# Patient Record
Sex: FEMALE | Race: WHITE | NOT HISPANIC OR LATINO | Employment: OTHER | URBAN - METROPOLITAN AREA
[De-identification: names, ages, dates, MRNs, and addresses within clinical notes are randomized per-mention and may not be internally consistent; named-entity substitution may affect disease eponyms.]

---

## 2018-01-13 LAB
INFLUENZA A ANTIGEN, POC: POSITIVE
INFLUENZA B ANTIGEN, POC: NEGATIVE
RAPID GROUP A STREP (OHS): NEGATIVE

## 2021-03-11 ENCOUNTER — HISTORICAL (OUTPATIENT)
Dept: RADIOLOGY | Facility: HOSPITAL | Age: 76
End: 2021-03-11

## 2021-03-15 ENCOUNTER — HISTORICAL (OUTPATIENT)
Dept: ADMINISTRATIVE | Facility: HOSPITAL | Age: 76
End: 2021-03-15

## 2021-03-15 LAB
BUN SERPL-MCNC: 11 MG/DL (ref 8–27)
CALCIUM SERPL-MCNC: 9.4 MG/DL (ref 8.7–10.3)
CHLORIDE SERPL-SCNC: 103 MMOL/L (ref 96–106)
CO2 SERPL-SCNC: 23 MMOL/L (ref 20–29)
CREAT SERPL-MCNC: 0.7 MG/DL (ref 0.57–1)
POTASSIUM SERPL-SCNC: 4.5 MMOL/L (ref 3.5–5.2)
SODIUM SERPL-SCNC: 142 MMOL/L (ref 134–144)

## 2021-06-01 ENCOUNTER — HISTORICAL (OUTPATIENT)
Dept: ADMINISTRATIVE | Facility: HOSPITAL | Age: 76
End: 2021-06-01

## 2021-06-01 LAB
ALBUMIN SERPL-MCNC: 4.3 G/DL (ref 3.7–4.7)
ALBUMIN/GLOB SERPL: 1.4 {RATIO} (ref 1.2–2.2)
ALP SERPL-CCNC: 56 IU/L (ref 48–121)
ALT SERPL-CCNC: 17 IU/L (ref 0–32)
AST SERPL-CCNC: 17 IU/L (ref 0–40)
BASOPHILS # BLD AUTO: 0.1 X10E3/UL (ref 0–0.2)
BASOPHILS NFR BLD AUTO: 1 %
BILIRUB SERPL-MCNC: 0.3 MG/DL (ref 0–1.2)
BUN SERPL-MCNC: 14 MG/DL (ref 8–27)
CALCIUM SERPL-MCNC: 9.5 MG/DL (ref 8.7–10.3)
CHLORIDE SERPL-SCNC: 102 MMOL/L (ref 96–106)
CHOLEST SERPL-MCNC: 138 MG/DL (ref 100–199)
CHOLEST/HDLC SERPL: 2.7 RATIO (ref 0–4.4)
CO2 SERPL-SCNC: 25 MMOL/L (ref 20–29)
CREAT SERPL-MCNC: 0.76 MG/DL (ref 0.57–1)
CREAT/UREA NIT SERPL: 18 (ref 12–28)
DEPRECATED CALCIDIOL+CALCIFEROL SERPL-MC: 33.8 NG/ML (ref 30–100)
EOSINOPHIL # BLD AUTO: 0.3 X10E3/UL (ref 0–0.4)
EOSINOPHIL NFR BLD AUTO: 3 %
ERYTHROCYTE [DISTWIDTH] IN BLOOD BY AUTOMATED COUNT: 15.6 % (ref 11.7–15.4)
GLOBULIN SER-MCNC: 3 G/DL (ref 1.5–4.5)
GLUCOSE SERPL-MCNC: 150 MG/DL (ref 65–99)
HCT VFR BLD AUTO: 39.8 % (ref 34–46.6)
HDLC SERPL-MCNC: 51 MG/DL
HGB BLD-MCNC: 12.2 G/DL (ref 11.1–15.9)
LDLC SERPL CALC-MCNC: 69 MG/DL (ref 0–99)
LYMPHOCYTES # BLD AUTO: 2.3 X10E3/UL (ref 0.7–3.1)
LYMPHOCYTES NFR BLD AUTO: 23 %
MCH RBC QN AUTO: 26.5 PG (ref 26.6–33)
MCHC RBC AUTO-ENTMCNC: 30.7 G/DL (ref 31.5–35.7)
MCV RBC AUTO: 86 FL (ref 79–97)
MICROALBUMIN/CREAT RATIO PNL UR: 7 MG/G CREAT (ref 0–29)
MONOCYTES # BLD AUTO: 0.7 X10E3/UL (ref 0.1–0.9)
MONOCYTES NFR BLD AUTO: 7 %
NEUTROPHILS # BLD AUTO: 6.6 X10E3/UL (ref 1.4–7)
NEUTROPHILS NFR BLD AUTO: 66 %
PLATELET # BLD AUTO: 268 X10E3/UL (ref 150–450)
POTASSIUM SERPL-SCNC: 4.5 MMOL/L (ref 3.5–5.2)
PROT SERPL-MCNC: 7.3 G/DL (ref 6–8.5)
RBC # BLD AUTO: 4.61 X10(6)/MCL (ref 3.77–5.28)
SODIUM SERPL-SCNC: 143 MMOL/L (ref 134–144)
TRIGL SERPL-MCNC: 98 MG/DL (ref 0–149)
TSH SERPL-ACNC: 1.69 MIU/ML (ref 0.45–4.5)
VLDLC SERPL CALC-MCNC: 18 MG/DL (ref 5–40)
WBC # SPEC AUTO: 10 X10E3/UL (ref 3.4–10.8)

## 2021-06-03 LAB — EST CREAT CLEARANCE SER (OHS): 110.06 ML/MIN

## 2021-07-07 ENCOUNTER — HISTORICAL (OUTPATIENT)
Dept: RADIOLOGY | Facility: HOSPITAL | Age: 76
End: 2021-07-07

## 2022-04-09 ENCOUNTER — HISTORICAL (OUTPATIENT)
Dept: ADMINISTRATIVE | Facility: HOSPITAL | Age: 77
End: 2022-04-09

## 2022-04-29 VITALS
OXYGEN SATURATION: 98 % | BODY MASS INDEX: 40.32 KG/M2 | WEIGHT: 242 LBS | HEIGHT: 65 IN | SYSTOLIC BLOOD PRESSURE: 132 MMHG | DIASTOLIC BLOOD PRESSURE: 75 MMHG

## 2022-04-30 NOTE — PROGRESS NOTES
Patient:   Arelis Loya            MRN: [id number]            FIN: [id number]               Age:   75 years     Sex:  Female     :  1945   Associated Diagnoses:   None   Author:   Magdalena Siddiqi      BP log reviewed:  - above goal  - increase    Valsartan to 320 mg daily   - Continue    Amlodipine 10 mg daily    Bisoprolol 5 mg daily    HCTZ 25 mg daily  - Repeat BP log x1 week   - BMP x1 week   * Time spent: 8 minutes

## 2022-07-05 PROBLEM — G47.00 INSOMNIA: Chronic | Status: ACTIVE | Noted: 2022-07-05

## 2022-07-05 PROBLEM — E66.9 TYPE 2 DIABETES MELLITUS WITH OBESITY: Status: ACTIVE | Noted: 2022-07-05

## 2022-07-05 PROBLEM — E66.01 MORBID OBESITY: Status: ACTIVE | Noted: 2022-07-05

## 2022-07-05 PROBLEM — M43.16 SPONDYLOLISTHESIS OF LUMBAR REGION: Chronic | Status: ACTIVE | Noted: 2022-07-05

## 2022-07-05 PROBLEM — I10 HYPERTENSION: Chronic | Status: ACTIVE | Noted: 2022-07-05

## 2022-07-05 PROBLEM — M43.16 SPONDYLOLISTHESIS OF LUMBAR REGION: Status: ACTIVE | Noted: 2022-07-05

## 2022-07-05 PROBLEM — E11.69 TYPE 2 DIABETES MELLITUS WITH OBESITY: Status: ACTIVE | Noted: 2022-07-05

## 2022-07-05 PROBLEM — M54.16 LUMBAR RADICULOPATHY: Chronic | Status: ACTIVE | Noted: 2022-07-05

## 2022-07-05 PROBLEM — K21.9 GASTROESOPHAGEAL REFLUX DISEASE WITHOUT ESOPHAGITIS: Status: ACTIVE | Noted: 2022-07-05

## 2022-07-05 PROBLEM — M19.90 ARTHRITIS: Status: ACTIVE | Noted: 2022-07-05

## 2022-07-05 PROBLEM — E66.9 TYPE 2 DIABETES MELLITUS WITH OBESITY: Chronic | Status: ACTIVE | Noted: 2022-07-05

## 2022-07-05 PROBLEM — E11.69 TYPE 2 DIABETES MELLITUS WITH OBESITY: Chronic | Status: ACTIVE | Noted: 2022-07-05

## 2022-07-05 PROBLEM — E66.01 MORBID OBESITY: Chronic | Status: ACTIVE | Noted: 2022-07-05

## 2022-07-05 PROBLEM — M54.16 LUMBAR RADICULOPATHY: Status: ACTIVE | Noted: 2022-07-05

## 2022-07-05 PROBLEM — K21.9 GASTROESOPHAGEAL REFLUX DISEASE WITHOUT ESOPHAGITIS: Chronic | Status: ACTIVE | Noted: 2022-07-05

## 2022-07-05 PROBLEM — E78.00 PURE HYPERCHOLESTEROLEMIA: Status: ACTIVE | Noted: 2022-07-05

## 2022-07-05 PROBLEM — I10 HYPERTENSION: Status: ACTIVE | Noted: 2022-07-05

## 2022-07-05 PROBLEM — M19.90 ARTHRITIS: Chronic | Status: ACTIVE | Noted: 2022-07-05

## 2022-07-05 PROBLEM — J30.2 SEASONAL ALLERGIES: Status: ACTIVE | Noted: 2022-07-05

## 2022-07-05 PROBLEM — J30.2 SEASONAL ALLERGIES: Chronic | Status: ACTIVE | Noted: 2022-07-05

## 2022-07-05 PROBLEM — G47.00 INSOMNIA: Status: ACTIVE | Noted: 2022-07-05

## 2022-07-05 PROBLEM — E78.00 PURE HYPERCHOLESTEROLEMIA: Chronic | Status: ACTIVE | Noted: 2022-07-05

## 2022-07-13 PROBLEM — Z00.00 WELLNESS EXAMINATION: Status: ACTIVE | Noted: 2022-07-13

## 2022-09-16 ENCOUNTER — HISTORICAL (OUTPATIENT)
Dept: ADMINISTRATIVE | Facility: HOSPITAL | Age: 77
End: 2022-09-16

## 2022-10-17 PROBLEM — Z00.00 WELLNESS EXAMINATION: Status: RESOLVED | Noted: 2022-07-13 | Resolved: 2022-10-17

## 2023-06-13 NOTE — PROGRESS NOTES
Subjective:      Patient ID: Arelis Loya is a 77 y.o. female.    Chief Complaint: Back Pain (Patient referred for low back pain which radiates down legs. Rates current pain at 8/10. Medication gives some relief. )    Referred by: Magdalena Monroy, *     HPI: Patient presents as a new consult for chronic low back pain with sciatica and lumbar radiculopathy.  Pain started spontaneously in 2010.  Pain located to bilateral low back, radiates to bilateral buttocks left greater than right, bilateral groin, with radiation to left anterior thigh radiating down to in her side of left knee.  Additionally she has pain to the right anterior top thigh the radiates just before the knee.  No radiation of pain to the feet.  Her legs feel weak.  She has never had a nerve conduction study or EMG.    Reports pain feels like a burning and achy sensation it is worse with walking prolonged standing and household chores is severely limited.  These activities elevate her pain 8-9/10.  Pain reduces with sitting or lying down to a 6/10.  Denies pain waking her up at night.    She has treated pain with physical therapy completed 3 weeks ago for 2-1/2 months.  This provided her no pain relief.  She continues home exercises.  In the past she completed 2 lumbar epidural steroids prior to her lumbar fusion they gave her no relief.    Medications include gabapentin 300 mg a day, Aleve 220 mg 3 times a day and tramadol unknown dose twice a day.      Pertinent past surgical history fusion L4 -5 (2021), No history of pacemaker, SCS or defrillators.     Interventional Pain History  Prior lumbar ESIs X 2  years  prior to lumbar fusion (ineffective)     ROS: Low back , buttock and leg pain    Labs 2023:  A1c 6.7  Creatinine 0.76   AST 17   ALT 15   GFR 76      MRI lumbar spine September 2022:   FINDINGS:  Normal lumbar curvature appears maintained.  Anterior and posterior fixation hardware is seen at L4-5 with orthopedic material at the L4-5  disc space.  The conus terminates at level of L1.  Vertebral body height and alignment appeared preserved.  Endplate signal changes adjacent to L2-3 and L5-S1 disc are compatible with Modic type changes.  Left renal cyst noted.      L2-3:  Disc bulging seen with large dorsal disc protrusion/extrusion extending 0.8 cm posterior and 1.3 cm cranial to its normal position producing moderate to severe spinal canal stenosis with encroachment and possible impingement on traversing nerve roots.  Moderately severe neural foraminal narrowing is seen bilaterally.    L3-4: Disc bulging seen with posterior facet hypertrophy and spondylitic changes effacing the ventral thecal sac and lateral recesses.  Mild neural foraminal narrowing appears to be present.      L4-5:  Posterior facet hypertrophy seen.      L5-S1: Disc bulging seen with spondylitic change and posterior facet hypertrophy.  A very small broad-based dorsal disc protrusion is seen.  There is effacement of the lateral recesses with encroachment traversing nerve roots.  Posterior facet hypertrophy noted.      IMPRESSION:  1.  Large dorsal disc protrusion/extrusion at L2-3 producing significant spinal canal stenosis and encroaching and likely impinging on transversing nerve roots.    2. postsurgical changes of L4-5.    3. Degenerative changes in the lumbar spine as above.  Neural foraminal narrowing seen at L2-3 and L3-4.    .            Objective:          Physical Exam  General: Well developed; overweight; A&O x 3; No anxiety/depression; NAD  Mental Status: Oriented to person, palce and time. Displays appropriate mood & affect.  Head: Norm cephalic and atraumatic  Neck: Midline trachea  Eyes: normal conjunctiva, normal lids, normal pupils  ENT and mouth: normal external ear, nose, and no lesions noted on the lips.  Respiratory: Symmetrical, Unlabored. No dyspnea  CV: normal  S1/S2, normal rhythm and rate. No peripheral edema.   Abdomen:  Non-distended    Extremities:  Gen: No cyanosis or tenderness to palpation bilateral upper and lower extremities  Skin: Warm, pink, dry, no rashes, no lesions on the lumbar spine  Strength: 5/5 motor strength bilateral upper and lower  ROM: Full ROM in bilateral knees and ankles without pain or instability.    Neuro:  Gait: no altalgic lean, normal toe and heel raise  DTR's: 2+ in bilateral patellar, and ankle  Sensory: Intact to light touch bilateral  upper and lower extremities    Spine: Normal lordosis. No scoliosis  L-spine ROM: pain with extension, and bilateral rotation, no pain and full ROm with flexion  Straight Leg Raise: + right , + left   SI Joint: No tenderness to palpation bilaterally.             Assessment:       Patient presents with bilateral low back pain radiating to the buttocks and anterior thighs past the knee on the left and stops just before the knee on the right.  No radiation to her feet.  Review of MRI lumbar spine notes L2-3 disc bulging seen with large dorsal disc protrusion/extrusion extending 0.8 cm posterior and 1.3 cm cranial to its normal position producing moderate to severe spinal canal stenosis with encroachment and possible impingement on traversing nerve roots.  Moderately severe neural foramen narrowing is seen bilaterally.L3-4: Disc bulging seen with posterior facet hypertrophy and spondylitic changes effacing the ventral thecal sac and lateral recesses.        Patient is a diabetic with controlled hemoglobin A1c.  She would like something to give her better pain relief while she is waiting to receive her epidural steroid.  Medrol Dosepak ordered for 21 days.  Hold ASA 81 mg and Aleeve one week prior to procdure.  (no clearance needed)  Request sent for TFESI bilateral L3   Follow up post op  Eventually would recommend weight loss to help with pain as well        Encounter Diagnoses   Name Primary?    Chronic midline low back pain with sciatica, sciatica laterality  unspecified Yes    Lumbar radiculopathy     Chronic low back pain with sciatica, sciatica laterality unspecified, unspecified back pain laterality          Plan:       Arelis was seen today for back pain.    Diagnoses and all orders for this visit:    Chronic midline low back pain with sciatica, sciatica laterality unspecified    Lumbar radiculopathy  -     Ambulatory referral/consult to Pain Clinic    Chronic low back pain with sciatica, sciatica laterality unspecified, unspecified back pain laterality  -     Ambulatory referral/consult to Pain Clinic                     Past Medical History:   Diagnosis Date    Arthritis 7/5/2022    Gastroesophageal reflux disease without esophagitis 7/5/2022    Hypertension 7/5/2022    Insomnia 7/5/2022    Lumbar radiculopathy 7/5/2022    Pure hypercholesterolemia 7/5/2022    Seasonal allergies 7/5/2022    Spondylolisthesis of lumbar region 7/5/2022    Type 2 diabetes mellitus with obesity 7/5/2022       Past Surgical History:   Procedure Laterality Date    back fusion Right     HYSTERECTOMY         History reviewed. No pertinent family history.    Social History     Socioeconomic History    Marital status:    Tobacco Use    Smoking status: Never    Smokeless tobacco: Never   Substance and Sexual Activity    Alcohol use: Not Currently       Current Outpatient Medications   Medication Sig Dispense Refill    amLODIPine (NORVASC) 10 MG tablet TAKE ONE TABLET BY MOUTH EVERY DAY 30 tablet 11    aspirin (ECOTRIN) 81 MG EC tablet TAKE ONE TABLET BY MOUTH DAILY 30 tablet 6    atorvastatin (LIPITOR) 40 MG tablet Take 40 mg by mouth once daily.      carvediloL (COREG) 25 MG tablet Take 1 tablet (25 mg total) by mouth 2 (two) times daily. 180 tablet 3    diphenoxylate-atropine 2.5-0.025 mg (LOMOTIL) 2.5-0.025 mg per tablet Take 1 tablet by mouth 4 (four) times daily as needed. 30 tablet 1    fluticasone propionate (FLONASE) 50 mcg/actuation nasal spray USE TWO SPRAYS IN EACH  NOSTRIL DAILY 16 g 3    gabapentin (NEURONTIN) 300 MG capsule Take 300 mg by mouth.      glimepiride (AMARYL) 2 MG tablet TAKE ONE TABLET BY MOUTH EVERY MORNING 30 tablet 11    hydroCHLOROthiazide (HYDRODIURIL) 25 MG tablet TAKE ONE TABLET BY MOUTH EVERY DAY 30 tablet 11    levocetirizine (XYZAL) 5 MG tablet See Instructions, TAKE ONE TABLET BY MOUTH EVERY EVENING, # 90 tab(s), 3 Refill(s), Pharmacy: [facility], 166, cm, Height/Length Dosing, 06/08/21 10:36:00 CDT, 109.76, kg, Weight Dosing, 12/20/21 14:14:00 CST 90 tablet 6    metFORMIN (GLUCOPHAGE) 500 MG tablet Take 500 mg by mouth.      pantoprazole (PROTONIX) 40 MG tablet TAKE ONE TABLET BY MOUTH EVERY DAY 30 tablet 6    SURE COMFORT LANCETS 28 gauge Misc CHECK BLOOD SUGAR ONCE DAILY      traZODone (DESYREL) 50 MG tablet Take 1 tablet (50 mg total) by mouth every evening. 30 tablet 11    TRUE METRIX GLUCOSE TEST STRIP Strp TEST BLOOD SUGAR ONCE DAILY      valsartan (DIOVAN) 320 MG tablet Take 1 tablet (320 mg total) by mouth once daily. 90 tablet 3    zinc 50 mg Tab Take 1 tablet by mouth every morning.      benzonatate (TESSALON) 200 MG capsule Take 200 mg by mouth 3 (three) times daily as needed.      vitamin D (VITAMIN D3) 1000 units Tab Take 1,000 Units by mouth once daily.       No current facility-administered medications for this visit.       Review of patient's allergies indicates:   Allergen Reactions    Celecoxib Shortness Of Breath    Meloxicam Itching and Shortness Of Breath    Moxifloxacin Shortness Of Breath    Rofecoxib Shortness Of Breath    Mepergan fortis     Mepergan Itching    Oseltamivir Nausea Only    Oxycodone-acetaminophen Itching

## 2023-06-14 ENCOUNTER — OFFICE VISIT (OUTPATIENT)
Dept: PAIN MEDICINE | Facility: CLINIC | Age: 78
End: 2023-06-14
Payer: MEDICARE

## 2023-06-14 VITALS
HEIGHT: 65 IN | BODY MASS INDEX: 39.32 KG/M2 | HEART RATE: 74 BPM | TEMPERATURE: 99 F | SYSTOLIC BLOOD PRESSURE: 101 MMHG | WEIGHT: 236 LBS | DIASTOLIC BLOOD PRESSURE: 52 MMHG

## 2023-06-14 DIAGNOSIS — M54.40 CHRONIC LOW BACK PAIN WITH SCIATICA, SCIATICA LATERALITY UNSPECIFIED, UNSPECIFIED BACK PAIN LATERALITY: ICD-10-CM

## 2023-06-14 DIAGNOSIS — M54.40 CHRONIC MIDLINE LOW BACK PAIN WITH SCIATICA, SCIATICA LATERALITY UNSPECIFIED: Primary | ICD-10-CM

## 2023-06-14 DIAGNOSIS — M51.36 BULGE OF LUMBAR DISC WITHOUT MYELOPATHY: ICD-10-CM

## 2023-06-14 DIAGNOSIS — G89.29 CHRONIC LOW BACK PAIN WITH SCIATICA, SCIATICA LATERALITY UNSPECIFIED, UNSPECIFIED BACK PAIN LATERALITY: ICD-10-CM

## 2023-06-14 DIAGNOSIS — G89.29 CHRONIC MIDLINE LOW BACK PAIN WITH SCIATICA, SCIATICA LATERALITY UNSPECIFIED: Primary | ICD-10-CM

## 2023-06-14 DIAGNOSIS — M54.16 LUMBAR RADICULOPATHY: ICD-10-CM

## 2023-06-14 PROCEDURE — 99204 PR OFFICE/OUTPT VISIT, NEW, LEVL IV, 45-59 MIN: ICD-10-PCS | Mod: ,,, | Performed by: NURSE PRACTITIONER

## 2023-06-14 PROCEDURE — 99204 OFFICE O/P NEW MOD 45 MIN: CPT | Mod: ,,, | Performed by: NURSE PRACTITIONER

## 2023-06-14 RX ORDER — METHYLPREDNISOLONE 4 MG/1
TABLET ORAL
Qty: 21 EACH | Refills: 0 | Status: SHIPPED | OUTPATIENT
Start: 2023-06-14 | End: 2023-06-27 | Stop reason: ALTCHOICE

## 2023-06-27 ENCOUNTER — OFFICE VISIT (OUTPATIENT)
Dept: PAIN MEDICINE | Facility: CLINIC | Age: 78
End: 2023-06-27
Payer: MEDICARE

## 2023-06-27 VITALS
WEIGHT: 236 LBS | BODY MASS INDEX: 39.32 KG/M2 | DIASTOLIC BLOOD PRESSURE: 73 MMHG | RESPIRATION RATE: 20 BRPM | HEIGHT: 65 IN | SYSTOLIC BLOOD PRESSURE: 133 MMHG | HEART RATE: 83 BPM | TEMPERATURE: 98 F

## 2023-06-27 DIAGNOSIS — M54.16 LUMBAR RADICULOPATHY: ICD-10-CM

## 2023-06-27 DIAGNOSIS — M51.36 DDD (DEGENERATIVE DISC DISEASE), LUMBAR: Primary | ICD-10-CM

## 2023-06-27 PROCEDURE — 99213 OFFICE O/P EST LOW 20 MIN: CPT | Mod: ,,, | Performed by: NURSE PRACTITIONER

## 2023-06-27 PROCEDURE — 99213 PR OFFICE/OUTPT VISIT, EST, LEVL III, 20-29 MIN: ICD-10-PCS | Mod: ,,, | Performed by: NURSE PRACTITIONER

## 2023-06-27 NOTE — H&P (VIEW-ONLY)
ADMISSION HISTORY & PHYSICAL    SUBJECTIVE:    CHIEF COMPLAINT: Back Pain (Chronic lower back pain with sciatica laterally specified. Pre Op for Transforaminal epidural steroid injection.)       History of Present Illness: 77 y.o. female presents today for preoperative evaluation for transforaminal bilateral L3 epidural steroid injection scheduled for 07/20/2023. I reviewed the indications for procedure. The risks and benefits of the proposed and alternative treatments were discussed with the patient. Questions pertinent to the procedure were solicited and answered. No assurances were given. Informed consent was obtained. The patient expressed good understanding and wished to proceed with scheduling the procedure.     Review of Systems:   Constitutional: No fever, weakness, or fatigue.   Ear/Nose/Mouth/Throat: No nasal congestion or sore throat.   Respiratory: No shortness of breath or cough.   Cardiovascular: No chest pain, palpitations, or peripheral edema.   Gastrointestinal: No nausea, vomiting, or abdominal pain.   Genitourinary: No dysuria.  Musculoskeletal: weakness to bilateral legs    Past Surgical History:   Procedure Laterality Date    back fusion Right     HYSTERECTOMY          Past Medical History:   Diagnosis Date    Arthritis 7/5/2022    Gastroesophageal reflux disease without esophagitis 7/5/2022    Hypertension 7/5/2022    Insomnia 7/5/2022    Lumbar radiculopathy 7/5/2022    Pure hypercholesterolemia 7/5/2022    Seasonal allergies 7/5/2022    Spondylolisthesis of lumbar region 7/5/2022    Type 2 diabetes mellitus with obesity 7/5/2022        OBJECTIVE:    Vitals:    06/27/23 1357   BP: 133/73   Pulse: 83   Resp:    Temp: 98.4 °F (36.9 °C)        Physical Exam:   General: Well-developed, well-nourished.  Neuro: Alert and oriented x 3.  Psych: Normal mood and affect.  HEENT: Normocephalic. PERRLA EOM intact. Nose and throat clear.  Lungs: Clear to auscultation and percussion.  Heart: Regular rate and  rhythm   Abdomen: Soft non-tender. Bowel sounds positive. No rebound tenderness.  Skin: No rashes or open wounds  Musc: bilateral SLR 4/5 bilateral lower ext and 5/5 bilateral upper ext  Gait: antalgic with walker    ASSESSMENT:  There are no diagnoses linked to this encounter.     PLAN:  Plan for to proceed with bilateral L3 transforaminal epidural steroid injection. The patient has been given preoperative instructions and prescriptions for post-operative medication. Post-operative appointment is scheduled for 2 weeks.     Clearance to  Cardiologist Dr Vernon Valentino ([place], [place]). needed to stop aspirin 81 mg per      She will need to hold Advil 5 days prior

## 2023-06-27 NOTE — PROGRESS NOTES
ADMISSION HISTORY & PHYSICAL    SUBJECTIVE:    CHIEF COMPLAINT: Back Pain (Chronic lower back pain with sciatica laterally specified. Pre Op for Transforaminal epidural steroid injection.)       History of Present Illness: 77 y.o. female presents today for preoperative evaluation for transforaminal bilateral L3 epidural steroid injection scheduled for 07/20/2023. I reviewed the indications for procedure. The risks and benefits of the proposed and alternative treatments were discussed with the patient. Questions pertinent to the procedure were solicited and answered. No assurances were given. Informed consent was obtained. The patient expressed good understanding and wished to proceed with scheduling the procedure.     Review of Systems:   Constitutional: No fever, weakness, or fatigue.   Ear/Nose/Mouth/Throat: No nasal congestion or sore throat.   Respiratory: No shortness of breath or cough.   Cardiovascular: No chest pain, palpitations, or peripheral edema.   Gastrointestinal: No nausea, vomiting, or abdominal pain.   Genitourinary: No dysuria.  Musculoskeletal: weakness to bilateral legs    Past Surgical History:   Procedure Laterality Date    back fusion Right     HYSTERECTOMY          Past Medical History:   Diagnosis Date    Arthritis 7/5/2022    Gastroesophageal reflux disease without esophagitis 7/5/2022    Hypertension 7/5/2022    Insomnia 7/5/2022    Lumbar radiculopathy 7/5/2022    Pure hypercholesterolemia 7/5/2022    Seasonal allergies 7/5/2022    Spondylolisthesis of lumbar region 7/5/2022    Type 2 diabetes mellitus with obesity 7/5/2022        OBJECTIVE:    Vitals:    06/27/23 1357   BP: 133/73   Pulse: 83   Resp:    Temp: 98.4 °F (36.9 °C)        Physical Exam:   General: Well-developed, well-nourished.  Neuro: Alert and oriented x 3.  Psych: Normal mood and affect.  HEENT: Normocephalic. PERRLA EOM intact. Nose and throat clear.  Lungs: Clear to auscultation and percussion.  Heart: Regular rate and  rhythm   Abdomen: Soft non-tender. Bowel sounds positive. No rebound tenderness.  Skin: No rashes or open wounds  Musc: bilateral SLR 4/5 bilateral lower ext and 5/5 bilateral upper ext  Gait: antalgic with walker    ASSESSMENT:  There are no diagnoses linked to this encounter.     PLAN:  Plan for to proceed with bilateral L3 transforaminal epidural steroid injection. The patient has been given preoperative instructions and prescriptions for post-operative medication. Post-operative appointment is scheduled for 2 weeks.     Clearance to  Cardiologist Dr Vernon Valentino ([place], [place]). needed to stop aspirin 81 mg per      She will need to hold Advil 5 days prior

## 2023-07-20 ENCOUNTER — HOSPITAL ENCOUNTER (OUTPATIENT)
Facility: HOSPITAL | Age: 78
Discharge: HOME | End: 2023-07-20
Attending: ANESTHESIOLOGY | Admitting: ANESTHESIOLOGY
Payer: MEDICARE

## 2023-07-20 ENCOUNTER — ANESTHESIA EVENT (OUTPATIENT)
Dept: SURGERY | Facility: HOSPITAL | Age: 78
End: 2023-07-20
Payer: MEDICARE

## 2023-07-20 ENCOUNTER — ANESTHESIA (OUTPATIENT)
Dept: SURGERY | Facility: HOSPITAL | Age: 78
End: 2023-07-20
Payer: MEDICARE

## 2023-07-20 DIAGNOSIS — M54.9 CHRONIC BACK PAIN GREATER THAN 3 MONTHS DURATION: ICD-10-CM

## 2023-07-20 DIAGNOSIS — G89.29 CHRONIC BACK PAIN GREATER THAN 3 MONTHS DURATION: ICD-10-CM

## 2023-07-20 LAB — POCT GLUCOSE: 134 MG/DL (ref 70–110)

## 2023-07-20 PROCEDURE — D9220A PRA ANESTHESIA: Mod: CRNA,,, | Performed by: NURSE ANESTHETIST, CERTIFIED REGISTERED

## 2023-07-20 PROCEDURE — 64483 NJX AA&/STRD TFRM EPI L/S 1: CPT | Mod: 50 | Performed by: ANESTHESIOLOGY

## 2023-07-20 PROCEDURE — 25000003 PHARM REV CODE 250: Performed by: NURSE ANESTHETIST, CERTIFIED REGISTERED

## 2023-07-20 PROCEDURE — 64483 NJX AA&/STRD TFRM EPI L/S 1: CPT | Mod: 50,,, | Performed by: ANESTHESIOLOGY

## 2023-07-20 PROCEDURE — 64483 PR EPIDURAL INJ, ANES/STEROID, TRANSFORAMINAL, LUMB/SACR, SNGL LEVL: ICD-10-PCS | Mod: 50,,, | Performed by: ANESTHESIOLOGY

## 2023-07-20 PROCEDURE — 63600175 PHARM REV CODE 636 W HCPCS: Performed by: ANESTHESIOLOGY

## 2023-07-20 PROCEDURE — D9220A PRA ANESTHESIA: Mod: ANES,,, | Performed by: ANESTHESIOLOGY

## 2023-07-20 PROCEDURE — D9220A PRA ANESTHESIA: ICD-10-PCS | Mod: CRNA,,, | Performed by: NURSE ANESTHETIST, CERTIFIED REGISTERED

## 2023-07-20 PROCEDURE — 37000008 HC ANESTHESIA 1ST 15 MINUTES: Performed by: ANESTHESIOLOGY

## 2023-07-20 PROCEDURE — 25000003 PHARM REV CODE 250: Performed by: ANESTHESIOLOGY

## 2023-07-20 PROCEDURE — D9220A PRA ANESTHESIA: ICD-10-PCS | Mod: ANES,,, | Performed by: ANESTHESIOLOGY

## 2023-07-20 PROCEDURE — 63600175 PHARM REV CODE 636 W HCPCS: Performed by: NURSE ANESTHETIST, CERTIFIED REGISTERED

## 2023-07-20 RX ORDER — SODIUM CHLORIDE 9 MG/ML
INJECTION, SOLUTION INTRAVENOUS CONTINUOUS
Status: CANCELLED | OUTPATIENT
Start: 2023-07-20

## 2023-07-20 RX ORDER — ONDANSETRON 2 MG/ML
4 INJECTION INTRAMUSCULAR; INTRAVENOUS ONCE
Status: CANCELLED | OUTPATIENT
Start: 2023-07-20 | End: 2023-07-20

## 2023-07-20 RX ORDER — LIDOCAINE HYDROCHLORIDE 10 MG/ML
INJECTION, SOLUTION EPIDURAL; INFILTRATION; INTRACAUDAL; PERINEURAL
Status: DISCONTINUED | OUTPATIENT
Start: 2023-07-20 | End: 2023-07-20

## 2023-07-20 RX ORDER — BUPIVACAINE HYDROCHLORIDE 2.5 MG/ML
INJECTION, SOLUTION EPIDURAL; INFILTRATION; INTRACAUDAL
Status: DISCONTINUED | OUTPATIENT
Start: 2023-07-20 | End: 2023-07-20 | Stop reason: HOSPADM

## 2023-07-20 RX ORDER — LIDOCAINE HYDROCHLORIDE 10 MG/ML
INJECTION, SOLUTION EPIDURAL; INFILTRATION; INTRACAUDAL; PERINEURAL
Status: DISCONTINUED
Start: 2023-07-20 | End: 2023-07-20 | Stop reason: HOSPADM

## 2023-07-20 RX ORDER — SODIUM CHLORIDE 9 MG/ML
INJECTION, SOLUTION INTRAVENOUS CONTINUOUS PRN
Status: DISCONTINUED | OUTPATIENT
Start: 2023-07-20 | End: 2023-07-20

## 2023-07-20 RX ORDER — PROPOFOL 10 MG/ML
VIAL (ML) INTRAVENOUS
Status: DISCONTINUED | OUTPATIENT
Start: 2023-07-20 | End: 2023-07-20

## 2023-07-20 RX ORDER — LIDOCAINE HYDROCHLORIDE 10 MG/ML
INJECTION, SOLUTION EPIDURAL; INFILTRATION; INTRACAUDAL; PERINEURAL
Status: DISCONTINUED | OUTPATIENT
Start: 2023-07-20 | End: 2023-07-20 | Stop reason: HOSPADM

## 2023-07-20 RX ORDER — METOCLOPRAMIDE HYDROCHLORIDE 5 MG/ML
10 INJECTION INTRAMUSCULAR; INTRAVENOUS EVERY 10 MIN PRN
Status: CANCELLED | OUTPATIENT
Start: 2023-07-20

## 2023-07-20 RX ORDER — LIDOCAINE HYDROCHLORIDE 10 MG/ML
1 INJECTION, SOLUTION EPIDURAL; INFILTRATION; INTRACAUDAL; PERINEURAL ONCE
Status: CANCELLED | OUTPATIENT
Start: 2023-07-20 | End: 2023-07-20

## 2023-07-20 RX ORDER — DIPHENHYDRAMINE HYDROCHLORIDE 50 MG/ML
25 INJECTION INTRAMUSCULAR; INTRAVENOUS EVERY 6 HOURS PRN
Status: CANCELLED | OUTPATIENT
Start: 2023-07-20

## 2023-07-20 RX ORDER — IPRATROPIUM BROMIDE AND ALBUTEROL SULFATE 2.5; .5 MG/3ML; MG/3ML
3 SOLUTION RESPIRATORY (INHALATION) ONCE AS NEEDED
Status: CANCELLED | OUTPATIENT
Start: 2023-07-20 | End: 2034-12-16

## 2023-07-20 RX ORDER — DEXAMETHASONE SODIUM PHOSPHATE 10 MG/ML
INJECTION INTRAMUSCULAR; INTRAVENOUS
Status: DISCONTINUED
Start: 2023-07-20 | End: 2023-07-20 | Stop reason: HOSPADM

## 2023-07-20 RX ORDER — DEXAMETHASONE SODIUM PHOSPHATE 10 MG/ML
INJECTION INTRAMUSCULAR; INTRAVENOUS
Status: DISCONTINUED | OUTPATIENT
Start: 2023-07-20 | End: 2023-07-20 | Stop reason: HOSPADM

## 2023-07-20 RX ORDER — MIDAZOLAM HYDROCHLORIDE 1 MG/ML
2 INJECTION INTRAMUSCULAR; INTRAVENOUS ONCE AS NEEDED
Status: CANCELLED | OUTPATIENT
Start: 2023-07-20 | End: 2034-12-16

## 2023-07-20 RX ADMIN — SODIUM CHLORIDE: 9 INJECTION, SOLUTION INTRAVENOUS at 09:07

## 2023-07-20 RX ADMIN — PROPOFOL 10 MG: 10 INJECTION, EMULSION INTRAVENOUS at 09:07

## 2023-07-20 RX ADMIN — PROPOFOL 50 MG: 10 INJECTION, EMULSION INTRAVENOUS at 09:07

## 2023-07-20 RX ADMIN — LIDOCAINE HYDROCHLORIDE 5 ML: 10 INJECTION, SOLUTION EPIDURAL; INFILTRATION; INTRACAUDAL; PERINEURAL at 09:07

## 2023-07-20 NOTE — ANESTHESIA POSTPROCEDURE EVALUATION
Anesthesia Post Evaluation    Patient: Arelis Loya    Procedure(s) Performed: Procedure(s) (LRB):  INJECTION, STEROID, EPIDURAL, TRANSFORAMINAL APPROACH Bilateral L3 (Bilateral)    Final Anesthesia Type: general      Patient location during evaluation: PACU  Patient participation: Yes- Able to Participate  Level of consciousness: awake and alert  Post-procedure vital signs: reviewed and stable  Pain management: adequate  Airway patency: patent  EVELIN mitigation strategies: Multimodal analgesia  PONV status at discharge: No PONV  Anesthetic complications: no      Cardiovascular status: hemodynamically stable  Respiratory status: unassisted  Hydration status: euvolemic  Follow-up not needed.          Vitals Value Taken Time   /79 07/20/23 1010   Temp 36.8 07/20/23 1109   Pulse 66 07/20/23 1010   Resp 16 07/20/23 1109   SpO2 97 % 07/20/23 1010         No case tracking events are documented in the log.      Pain/Iva Score: Iva Score: 10 (7/20/2023 10:09 AM)  Modified Iva Score: 20 (7/20/2023 10:41 AM)

## 2023-07-20 NOTE — ANESTHESIA PREPROCEDURE EVALUATION
07/20/2023  Arelis Loya is a 77 y.o., female presenting for Bilateral Lumbar TESI.    Other Medical History   Lumbar radiculopathy Seasonal allergies   Hypertension Pure hypercholesterolemia   Type 2 diabetes mellitus with obesity Gastroesophageal reflux disease without esophagitis   Arthritis Spondylolisthesis of lumbar region   Insomnia EVELIN on CPAP     Surgical History    HYSTERECTOMY back fusion       Pre-op Assessment    I have reviewed the Patient Summary Reports.     I have reviewed the Nursing Notes. I have reviewed the NPO Status.   I have reviewed the Medications.     Review of Systems  Anesthesia Hx:  No problems with previous Anesthesia    Social:  Non-Smoker    Cardiovascular:   Hypertension    Pulmonary:   Sleep Apnea    Hepatic/GI:   GERD    Neurological:   Neuromuscular Disease,    Endocrine:   Diabetes  Obesity / BMI > 30      Physical Exam  General: Well nourished, Cooperative, Alert and Oriented    Airway:  Mallampati: III   Mouth Opening: Normal  TM Distance: Normal  Tongue: Normal  Neck ROM: Normal ROM    Dental:  Intact    Chest/Lungs:  Clear to auscultation, Normal Respiratory Rate    Heart:  Rate: Normal  Rhythm: Regular Rhythm  Sounds: Normal    Abdomen:  Normal, Soft, Nontender        Anesthesia Plan  Type of Anesthesia, risks & benefits discussed:    Anesthesia Type: MAC  Intra-op Monitoring Plan: Standard ASA Monitors  Post Op Pain Control Plan: multimodal analgesia  Induction:  IV  Airway Plan: Direct  Informed Consent: Informed consent signed with the Patient and all parties understand the risks and agree with anesthesia plan.  All questions answered.   ASA Score: 3  Day of Surgery Review of History & Physical: H&P Update referred to the surgeon/provider.    Ready For Surgery From Anesthesia Perspective.     .

## 2023-07-20 NOTE — TRANSFER OF CARE
"Anesthesia Transfer of Care Note    Patient: Arelis Loya    Procedure(s) Performed: Procedure(s) (LRB):  INJECTION, STEROID, EPIDURAL, TRANSFORAMINAL APPROACH Bilateral L3 (Bilateral)    Patient location: [facility]    Anesthesia Type: general    Transport from OR: Transported from OR on room air with adequate spontaneous ventilation    Post pain: adequate analgesia    Post assessment: no apparent anesthetic complications    Post vital signs: stable    Level of consciousness: awake and alert    Nausea/Vomiting: no nausea/vomiting    Complications: none          Last vitals:   Visit Vitals  /77   Pulse 63   Temp 36.6 °C (97.9 °F) (Oral)   Resp 17   Ht 5' 5" (1.651 m)   Wt 103.5 kg (228 lb 2.8 oz)   SpO2 99%   Breastfeeding No   BMI 37.97 kg/m²     "

## 2023-07-20 NOTE — OP NOTE
Procedure:    Left L3  transforaminal epidural steroid injection    Right L3  transforaminal epidural steroid injection    Pre-Procedure Diagnoses:  Chronic back pain greater than 3 months  Lumbar degenerative disc disease  Lumbar radiculopathy  Lumbar disc displacement    Post-Procedure Diagnoses:  Chronic back pain greater than 3 months  Lumbar degenerative disc disease  Lumbar radiculopathy  Lumbar disc displacement    Anesthesia:  Local and MAC    Estimated Blood Loss:  < 2 ML    Consent:  The procedure, risks, benefits, and alternatives were discussed with the patient.  The patient voiced understanding and fully informed written consent was obtained.    Description of the Procedure:  The patient was taken to the operating room and placed in the prone position. The skin overlying the lumbar spine was prepped with Chloraprep and draped in the usual sterile fashion.  An oblique fluoroscopic view was obtained on the left side at L3, with the superior articular process of the inferior vertebral body aligned with the pedicle.  Skin anesthesia was achieved using 2 mL of lidocaine 1%.  A 22-gauge 5 -inch Quinke spinal needle was inserted and advanced under intermittent fluoroscopic views into the epidural space. Proper needle position was confirmed under AP, oblique, and lateral fluoroscopic views.  Negative aspiration for blood or CSF was confirmed. 1 mL of contrast was injected, which revealed spread into the epidural space.  Then a combination of 5 mg of dexamethasone with 1 mL of 0.25% bupivacaine was easily injected.   There was no pain on injection. The needle was removed intact and bleeding was nil.  The same procedure was repeated in identical fashion on the right side at L3.  Sterile bandages were applied. The patient was taken to the recovery room for further observation in stable condition. The patient was then discharged home with no complications.

## 2023-07-20 NOTE — DISCHARGE SUMMARY
[facility] - Periop Services  Discharge Note  Short Stay    Procedure(s) (LRB):  INJECTION, STEROID, EPIDURAL, TRANSFORAMINAL APPROACH Bilateral L3 (Bilateral)      OUTCOME: Patient tolerated treatment/procedure well without complication and is now ready for discharge.    DISPOSITION: Home or Self Care    FINAL DIAGNOSIS:  <principal problem not specified>    FOLLOWUP: In clinic    DISCHARGE INSTRUCTIONS:  No discharge procedures on file.     TIME SPENT ON DISCHARGE: 5 minutes

## 2023-07-23 VITALS
OXYGEN SATURATION: 97 % | SYSTOLIC BLOOD PRESSURE: 144 MMHG | HEART RATE: 66 BPM | TEMPERATURE: 98 F | DIASTOLIC BLOOD PRESSURE: 79 MMHG | HEIGHT: 65 IN | BODY MASS INDEX: 38.02 KG/M2 | RESPIRATION RATE: 17 BRPM | WEIGHT: 228.19 LBS

## 2023-08-02 ENCOUNTER — OFFICE VISIT (OUTPATIENT)
Dept: PAIN MEDICINE | Facility: CLINIC | Age: 78
End: 2023-08-02
Payer: MEDICARE

## 2023-08-02 VITALS
BODY MASS INDEX: 38.65 KG/M2 | SYSTOLIC BLOOD PRESSURE: 136 MMHG | HEIGHT: 65 IN | DIASTOLIC BLOOD PRESSURE: 74 MMHG | HEART RATE: 80 BPM | TEMPERATURE: 98 F | WEIGHT: 232 LBS

## 2023-08-02 DIAGNOSIS — G89.29 CHRONIC LOW BACK PAIN WITH SCIATICA, SCIATICA LATERALITY UNSPECIFIED, UNSPECIFIED BACK PAIN LATERALITY: ICD-10-CM

## 2023-08-02 DIAGNOSIS — M54.40 CHRONIC LOW BACK PAIN WITH SCIATICA, SCIATICA LATERALITY UNSPECIFIED, UNSPECIFIED BACK PAIN LATERALITY: ICD-10-CM

## 2023-08-02 DIAGNOSIS — M51.36 BULGE OF LUMBAR DISC WITHOUT MYELOPATHY: Primary | ICD-10-CM

## 2023-08-02 DIAGNOSIS — R26.89 GAIT, ANTALGIC: ICD-10-CM

## 2023-08-02 DIAGNOSIS — M54.16 LUMBAR RADICULOPATHY: ICD-10-CM

## 2023-08-02 PROCEDURE — 99214 OFFICE O/P EST MOD 30 MIN: CPT | Mod: ,,, | Performed by: NURSE PRACTITIONER

## 2023-08-02 PROCEDURE — 99214 PR OFFICE/OUTPT VISIT, EST, LEVL IV, 30-39 MIN: ICD-10-PCS | Mod: ,,, | Performed by: NURSE PRACTITIONER

## 2023-08-02 RX ORDER — METHYLPREDNISOLONE 4 MG/1
TABLET ORAL
Qty: 21 EACH | Refills: 0 | Status: SHIPPED | OUTPATIENT
Start: 2023-08-02 | End: 2023-08-23

## 2023-08-02 NOTE — PROGRESS NOTES
"Subjective:      Patient ID: Arelis Loya is a 77 y.o. female.    Chief Complaint: Back Pain (Post-op bilateral TFESI L3 7/20/23., walking with walker, pt states she received relief for 2 days, advil for relief, pt would like to discuss rehab or HH P.T., pain level 8/10)    Referred by: No ref. provider found     HPI: Patient presents as a follow-up for pain associated with lumbar DDD and radiculopathy after completing a  transforaminal epidural steroid injection to bilateral L3 on 07/20/2023.  Unfortunately patient received 2 days of excellent pain control and now her pain has returned to the same region.  More pronounced with going down the lateral legs to the ankle.  She has pertinent past surgical history of lumbar fusion to L4/L5.    Patient continues to have low back pain with sciatica .  Pain remains located to bilateral low back, radiates to bilateral buttocks left greater than right, bilateral groin, with radiation to left anterior thigh radiating down to in her side of left knee and lateral left shin (L4 + L5).  Additionally she has pain to the right anterior  thigh (L4) the radiates lateral (L5) just before the knee.  No radiation of pain to the feet.  Her legs feel weak.  She has never had a nerve conduction study or EMG.     Reports pain feels like a burning and achy sensation it is worse with walking prolonged standing and household chores is severely limited.  These activities elevate her pain 8-9/10.  Pain reduces with sitting or lying down to a 6/10.  Denies pain waking her up at night as she takes sleep aides and reports "evil pain" waking     She has treated pain with physical therapy completed 3 weeks ago for 2-1/2 months.  This provided her no pain relief.  She continues home exercises.  In the past she completed 2 lumbar epidural steroids prior to her lumbar fusion they gave her no relief.     Medications include gabapentin 300 mg a day, Aleve 220 mg 3 times a day and in the past took " "Tramadol         Pertinent past surgical history fusion L4 -5 (2021), No history of pacemaker, SCS or defrillators.         Vital signs:   Vitals:    08/02/23 1314   BP: 136/74   Pulse: 80   Temp: 97.6 °F (36.4 °C)   TempSrc: Oral   Weight: 105.2 kg (232 lb)   Height: 5' 5" (1.651 m)   PainSc:   8     Body mass index is 38.61 kg/m².  Pain Disability Index (PDI): 47       Interventional Pain History  07/20/2023:  Transforaminal epidural steroid injection to bilateral L3       ROS: Low back and leg pain    MRI Lumbar Spine     DISCUSSION:      There are 5 nonrib-bearing lumbar-type vertebral bodies. There is  grade 1 anterolisthesis of L5 over S1. The vertebral heights are  maintained. There are degenerative endplate changes with reactive  endplate edema at L5-S1.     The conus terminates at the level of L1. It is normal in signal and  contour.     Disc spaces, spinal canal and neural foramina are as follows:     L1-L2: Minimal disc bulge and bilateral facet hypertrophy. No  significant spinal canal or neural foraminal stenosis.      L2-L3: Disc bulge and superimposed disc protrusion measuring 3 mm in  AP dimension and facet hypertrophy which cause moderate narrowing of  the spinal canal. There is mild to moderate bilateral neural foraminal  stenosis.      L3-L4: Disc bulge and bilateral facet hypertrophy which cause mild  narrowing of the spinal canal. Mild bilateral neural foraminal  stenosis.      L4-L5: Disc bulge and central disc protrusion measuring 3 mm in AP  dimension and facet hypertrophy which cause severe spinal canal  stenosis with partial effacement of the CSF space. There is severe  right and moderate left neural foraminal stenosis.      L5-S1: Grade 1 anterolisthesis of L5 over S1. Disc height loss with  posterior marginal osteophytes and facet hypertrophy. No significant  spinal canal stenosis. Mild right and moderate left neural foraminal  stenosis.      No significant abnormality within the " visualized paraspinous  musculature. Left renal T2 hyperintensities may represent cysts.     IMPRESSION:   1.  Severe degenerative spinal canal stenosis at L4-L5, moderate at  L2-L3, mild at L3-4.  2.  Multilevel neural foraminal stenoses as described, severe on the  right at L4-L5.        Objective:          Physical Exam  General: Well developed; overweight; A&O x 3; No anxiety/depression; NAD  Mental Status: Oriented to person, palce and time. Displays appropriate mood & affect.  Head: Norm cephalic and atraumatic  Neck:  No cervical paraspinal banding.  Full range of motion with lateral turning and cervical flexion +extension.  Eyes: normal conjunctiva, normal lids, normal pupils  ENT and mouth: normal external ear, nose, and no lesions noted on the lips.  Respiratory: Symmetrical, Unlabored. No dyspnea  CV: normal rhythm and rate. No peripheral edema.   Abdomen: Non-distended    Extremities:  Gen: No cyanosis or tenderness to palpation bilateral upper and lower extremities  Skin: Warm, pink, dry, no rashes, no lesions on the lumbar spine  Strength: 5/5 motor strength bilateral upper and lower extremities  ROM: Full ROM in bilateral knees and ankles without pain or instability.    Neuro:  Gait: no altalgic lean, normal toe and heel raise. Independent ambulator.  DTR's: 2+ in bilateral patellar, and ankle  Sensory: Intact to light touch bilateral  upper and lower extremities    Spine: Normal lordosis. No scoliosis  L-spine ROM: Full ROM to flexion, extension, bilateral rotation,   Straight Leg Raise:  Positive right, positive left  SI Joint: No tenderness to palpation bilaterally.               Assessment:     Patient has ongoing bilateral low back pain and radiation down the lateral legs that is more problematic in the L5 dermatome region.  Raise during exam findings in an antalgic gait that is very guarded.  She also has a notable tenderness to the L5 region of her posterior low back when palpating both sides  of her buttock    MRI lumbar spine shows L4-L5: Disc bulge and central disc protrusion measuring 3 mm in AP dimension and facet hypertrophy which cause severe spinal canal  stenosis with partial effacement of the CSF space. There is severe  right and moderate left neural foraminal stenosis      Request sent for bilateral TFESI L5 for middle of September  Preop request with Dr. Cain  Medrol dose  ordered   Consider increase of gabapentin  Referral sent to Dr Aliyah Rubin to eval and treat bilateral sciatica         Encounter Diagnoses   Name Primary?    Bulge of lumbar disc without myelopathy Yes    Lumbar radiculopathy     Chronic low back pain with sciatica, sciatica laterality unspecified, unspecified back pain laterality          Plan:       Arelis was seen today for back pain.    Diagnoses and all orders for this visit:    Bulge of lumbar disc without myelopathy  -     methylPREDNISolone (MEDROL DOSEPACK) 4 mg tablet; use as directed    Lumbar radiculopathy  -     methylPREDNISolone (MEDROL DOSEPACK) 4 mg tablet; use as directed    Chronic low back pain with sciatica, sciatica laterality unspecified, unspecified back pain laterality  -     methylPREDNISolone (MEDROL DOSEPACK) 4 mg tablet; use as directed             Past Medical History:   Diagnosis Date    Arthritis 07/05/2022    Gastroesophageal reflux disease without esophagitis 07/05/2022    Hypertension 07/05/2022    Insomnia 07/05/2022    Lumbar radiculopathy 07/05/2022    EVELIN on CPAP     Pure hypercholesterolemia 07/05/2022    Seasonal allergies 07/05/2022    Spondylolisthesis of lumbar region 07/05/2022    Type 2 diabetes mellitus with obesity 07/05/2022       Past Surgical History:   Procedure Laterality Date    back fusion Right     FESS, WITH NASAL SEPTOPLASTY      HEMORRHOID SURGERY      HYSTERECTOMY      TRANSFORAMINAL EPIDURAL INJECTION OF STEROID Bilateral 7/20/2023    Procedure: INJECTION, STEROID, EPIDURAL, TRANSFORAMINAL APPROACH Bilateral  L3;  Surgeon: Shanell Cain MD;  Location: [facility];  Service: Pain Management;  Laterality: Bilateral;  Bilateral TFESI L3       No family history on file.    Social History     Socioeconomic History    Marital status:    Tobacco Use    Smoking status: Never    Smokeless tobacco: Never   Substance and Sexual Activity    Alcohol use: Never    Drug use: Never       Current Outpatient Medications   Medication Sig Dispense Refill    amLODIPine (NORVASC) 10 MG tablet TAKE ONE TABLET BY MOUTH EVERY DAY 30 tablet 11    ascorbic acid, vitamin C, (VITAMIN C) 1000 MG tablet Take 1,000 mg by mouth once daily.      aspirin (ECOTRIN) 81 MG EC tablet TAKE ONE TABLET BY MOUTH DAILY 30 tablet 6    atorvastatin (LIPITOR) 40 MG tablet Take 40 mg by mouth once daily.      benzonatate (TESSALON) 200 MG capsule Take 200 mg by mouth 3 (three) times daily as needed.      carvediloL (COREG) 25 MG tablet Take 1 tablet (25 mg total) by mouth 2 (two) times daily. 180 tablet 3    diphenoxylate-atropine 2.5-0.025 mg (LOMOTIL) 2.5-0.025 mg per tablet Take 1 tablet by mouth 4 (four) times daily as needed. 30 tablet 1    fluticasone propionate (FLONASE) 50 mcg/actuation nasal spray USE TWO SPRAYS IN EACH NOSTRIL DAILY 16 g 3    gabapentin (NEURONTIN) 300 MG capsule Take 300 mg by mouth.      glimepiride (AMARYL) 2 MG tablet TAKE ONE TABLET BY MOUTH EVERY MORNING 30 tablet 11    hydroCHLOROthiazide (HYDRODIURIL) 25 MG tablet TAKE ONE TABLET BY MOUTH EVERY DAY 30 tablet 11    levocetirizine (XYZAL) 5 MG tablet See Instructions, TAKE ONE TABLET BY MOUTH EVERY EVENING, # 90 tab(s), 3 Refill(s), Pharmacy: [facility], 166, cm, Height/Length Dosing, 06/08/21 10:36:00 CDT, 109.76, kg, Weight Dosing, 12/20/21 14:14:00 CST 90 tablet 6    metFORMIN (GLUCOPHAGE) 500 MG tablet Take 500 mg by mouth.      pantoprazole (PROTONIX) 40 MG tablet TAKE ONE TABLET BY MOUTH EVERY DAY 30 tablet 6    SURE COMFORT LANCETS 28 gauge Misc CHECK BLOOD SUGAR  ONCE DAILY      traZODone (DESYREL) 50 MG tablet Take 1 tablet (50 mg total) by mouth every evening. 30 tablet 11    TRUE METRIX GLUCOSE TEST STRIP Strp TEST BLOOD SUGAR ONCE DAILY      valsartan (DIOVAN) 320 MG tablet Take 1 tablet (320 mg total) by mouth once daily. 90 tablet 3    zinc 50 mg Tab Take 1 tablet by mouth every morning.      methylPREDNISolone (MEDROL DOSEPACK) 4 mg tablet use as directed 21 each 0     No current facility-administered medications for this visit.       Review of patient's allergies indicates:   Allergen Reactions    Celecoxib Shortness Of Breath    Meloxicam Itching and Shortness Of Breath    Moxifloxacin Shortness Of Breath    Rofecoxib Shortness Of Breath    Mepergan fortis     Mepergan Itching    Oseltamivir Nausea Only    Oxycodone-acetaminophen Itching

## 2023-08-02 NOTE — LETTER
Date 2023    Re:   Arelis Loya    :   1945    Dr. Vernon Andre Valentino ,           The above named patient is scheduled to have a bilateral transforaminal epidural steriod injectio at L5 on 2023.        *Type of Anesthesia: local MAC     This patient needs surgical clearance from your office for this procedure.  Please perform necessary tests in order for this patient to be medically cleared for surgery. Please send or fax the clearance letter with most recent ECHO, EKG or stress test, to our office as soon as possible.     Our fax number is [phone].          We appreciate your help in getting our patient cleared for surgery.  Please feel free to call our office should you have any questions, [phone].             Thank You,   Velia Gonzalez

## 2023-08-03 ENCOUNTER — TELEPHONE (OUTPATIENT)
Dept: PAIN MEDICINE | Facility: CLINIC | Age: 78
End: 2023-08-03
Payer: COMMERCIAL

## 2023-08-03 NOTE — TELEPHONE ENCOUNTER
pt is requesting gabapentin twice a day she said her pharmacy prepackages her meds and ya'll discussed yesterday, she feels is she takes two a day she will get better relief, I did not see a new rx for gabapentin in chart, please advise thanks   Correct pharmacy on file

## 2023-08-03 NOTE — TELEPHONE ENCOUNTER
Contacted pt to schedule injection she is requesting and order for wheelchair said she never had a wheel chair and would like it to attempt in becoming  more self sufficient  for longer distances. Please advise thanks

## 2023-08-09 ENCOUNTER — TELEPHONE (OUTPATIENT)
Dept: NEUROSURGERY | Facility: CLINIC | Age: 78
End: 2023-08-09
Payer: COMMERCIAL

## 2023-08-09 NOTE — TELEPHONE ENCOUNTER
Patient is scheduled with Dr. Rubin January 9, 2024 @ 10:00 AM. Patient is put on the waiting list as well.

## 2023-09-01 ENCOUNTER — OFFICE VISIT (OUTPATIENT)
Dept: PAIN MEDICINE | Facility: CLINIC | Age: 78
End: 2023-09-01
Payer: MEDICARE

## 2023-09-01 VITALS
SYSTOLIC BLOOD PRESSURE: 129 MMHG | WEIGHT: 235 LBS | BODY MASS INDEX: 39.15 KG/M2 | DIASTOLIC BLOOD PRESSURE: 64 MMHG | HEART RATE: 72 BPM | TEMPERATURE: 98 F | HEIGHT: 65 IN

## 2023-09-01 DIAGNOSIS — M43.16 SPONDYLOLISTHESIS OF LUMBAR REGION: ICD-10-CM

## 2023-09-01 PROCEDURE — 99215 OFFICE O/P EST HI 40 MIN: CPT | Mod: ,,, | Performed by: ANESTHESIOLOGY

## 2023-09-01 PROCEDURE — 99215 PR OFFICE/OUTPT VISIT, EST, LEVL V, 40-54 MIN: ICD-10-PCS | Mod: ,,, | Performed by: ANESTHESIOLOGY

## 2023-09-01 RX ORDER — GABAPENTIN 300 MG/1
300 CAPSULE ORAL 3 TIMES DAILY
Qty: 270 CAPSULE | Refills: 1 | Status: SHIPPED | OUTPATIENT
Start: 2023-09-01 | End: 2023-11-30

## 2023-09-01 NOTE — PROGRESS NOTES
"Subjective:      Patient ID: Arelis Loya is a 78 y.o. female.    Chief Complaint: Back Pain (Preop for Stefan TFESI  L5 9/21/23. No numbness or tingling. Pain going the legs more to the left then the right.  Taking RX & OTC. Home exercise does not really help use a walker to ambulate.  Pain level today is  9/10. )    Referred by: No ref. provider found       Back Pain    Patient presents as a follow-up for pain associated with lumbar DDD and radiculopathy after completing a  transforaminal epidural steroid injection to bilateral L3 on 07/20/2023.  Unfortunately patient received 2 days of excellent pain control and now her pain has returned to the same region.  More pronounced with going down the lateral legs to the ankle.  She has pertinent past surgical history of lumbar fusion to L4/L5.    Patient continues to have low back pain with sciatica .  Pain remains located to bilateral low back, radiates to bilateral buttocks left greater than right, bilateral groin, with radiation to left anterior thigh radiating down to in her side of left knee and lateral left shin (L4 + L5).  Additionally she has pain to the right anterior  thigh (L4) the radiates lateral (L5) just before the knee.  No radiation of pain to the feet.  Her legs feel weak.  She has never had a nerve conduction study or EMG.     Reports pain feels like a burning and achy sensation it is worse with walking prolonged standing and household chores is severely limited.  These activities elevate her pain 8-9/10.  Pain reduces with sitting or lying down to a 6/10.  Denies pain waking her up at night as she takes sleep aides and reports "evil pain" waking     She has treated pain with physical therapy completed 3 weeks ago for 2-1/2 months.  This provided her no pain relief.  She continues home exercises.  In the past she completed 2 lumbar epidural steroids prior to her lumbar fusion they gave her no relief.    Scheduled for bilateral L5 transforaminal " "epidural steroid injections later this month.  She is scheduled to see Dr. Rubin for an evaluation in January.  Vital signs:   Vitals:    09/01/23 1045   BP: 129/64   Pulse: 72   Temp: 97.5 °F (36.4 °C)   Weight: 106.6 kg (235 lb)   Height: 5' 5" (1.651 m)   PainSc:   9     Body mass index is 39.11 kg/m².  Pain Disability Index (PDI): 43       Interventional Pain History  07/20/2023:  Transforaminal epidural steroid injection to bilateral L3       Review of Systems   Musculoskeletal:  Positive for back pain.   : Low back and leg pain    MRI Lumbar Spine     DISCUSSION:      There are 5 nonrib-bearing lumbar-type vertebral bodies. There is  grade 1 anterolisthesis of L5 over S1. The vertebral heights are  maintained. There are degenerative endplate changes with reactive  endplate edema at L5-S1.     The conus terminates at the level of L1. It is normal in signal and  contour.     Disc spaces, spinal canal and neural foramina are as follows:     L1-L2: Minimal disc bulge and bilateral facet hypertrophy. No  significant spinal canal or neural foraminal stenosis.      L2-L3: Disc bulge and superimposed disc protrusion measuring 3 mm in  AP dimension and facet hypertrophy which cause moderate narrowing of  the spinal canal. There is mild to moderate bilateral neural foraminal  stenosis.      L3-L4: Disc bulge and bilateral facet hypertrophy which cause mild  narrowing of the spinal canal. Mild bilateral neural foraminal  stenosis.      L4-L5: Disc bulge and central disc protrusion measuring 3 mm in AP  dimension and facet hypertrophy which cause severe spinal canal  stenosis with partial effacement of the CSF space. There is severe  right and moderate left neural foraminal stenosis.      L5-S1: Grade 1 anterolisthesis of L5 over S1. Disc height loss with  posterior marginal osteophytes and facet hypertrophy. No significant  spinal canal stenosis. Mild right and moderate left neural foraminal  stenosis.      No significant " abnormality within the visualized paraspinous  musculature. Left renal T2 hyperintensities may represent cysts.     IMPRESSION:   1.  Severe degenerative spinal canal stenosis at L4-L5, moderate at  L2-L3, mild at L3-4.  2.  Multilevel neural foraminal stenoses as described, severe on the  right at L4-L5.        Objective:          Physical Exam  General: Well developed; overweight; A&O x 3; No anxiety/depression; NAD  Mental Status: Oriented to person, palce and time. Displays appropriate mood & affect.  Head: Norm cephalic and atraumatic  Neck:  No cervical paraspinal banding.  Full range of motion with lateral turning and cervical flexion +extension.  Eyes: normal conjunctiva, normal lids, normal pupils  ENT and mouth: normal external ear, nose, and no lesions noted on the lips.  Respiratory: Symmetrical, Unlabored. No dyspnea  CV: normal rhythm and rate. No peripheral edema.   Abdomen: Non-distended    Extremities:  Gen: No cyanosis or tenderness to palpation bilateral upper and lower extremities  Skin: Warm, pink, dry, no rashes, no lesions on the lumbar spine  Strength: 5/5 motor strength bilateral upper and lower extremities  ROM: Full ROM in bilateral knees and ankles without pain or instability.    Neuro:  Gait: no altalgic lean, normal toe and heel raise. Independent ambulator.  DTR's: 2+ in bilateral patellar, and ankle  Sensory: Intact to light touch bilateral  upper and lower extremities    Spine: Normal lordosis. No scoliosis  L-spine ROM: Full ROM to flexion, extension, bilateral rotation,   Straight Leg Raise:  Positive right, positive left  SI Joint: No tenderness to palpation bilaterally.               Assessment:     Patient has ongoing bilateral low back pain and radiation down the lateral legs that is more problematic in the L5 dermatome region.  Raise during exam findings in an antalgic gait that is very guarded.  She also has a notable tenderness to the L5 region of her posterior low back when  palpating both sides of her buttock    MRI lumbar spine shows L4-L5: Disc bulge and central disc protrusion measuring 3 mm in AP dimension and facet hypertrophy which cause severe spinal canal  stenosis with partial effacement of the CSF space. There is severe  right and moderate left neural foraminal stenosis      Encounter Diagnosis   Name Primary?    Spondylolisthesis of lumbar region          Plan:       Arelis was seen today for back pain.    Diagnoses and all orders for this visit:    Spondylolisthesis of lumbar region  -     gabapentin (NEURONTIN) 300 MG capsule; Take 1 capsule (300 mg total) by mouth 3 (three) times daily.    We will proceed with bilateral L5 transforaminal epidural steroid injections as scheduled later this month.  The plan was discussed with the patient and she wishes to proceed.  I am also increasing her gabapentin up to 300 mg 3 times per day for the neuropathic component of her pain.          Past Medical History:   Diagnosis Date    Arthritis 07/05/2022    Gastroesophageal reflux disease without esophagitis 07/05/2022    Hypertension 07/05/2022    Insomnia 07/05/2022    Lumbar radiculopathy 07/05/2022    EVELIN on CPAP     Pure hypercholesterolemia 07/05/2022    Seasonal allergies 07/05/2022    Spondylolisthesis of lumbar region 07/05/2022    Type 2 diabetes mellitus with obesity 07/05/2022       Past Surgical History:   Procedure Laterality Date    back fusion Right     FESS, WITH NASAL SEPTOPLASTY      HEMORRHOID SURGERY      HYSTERECTOMY      TRANSFORAMINAL EPIDURAL INJECTION OF STEROID Bilateral 7/20/2023    Procedure: INJECTION, STEROID, EPIDURAL, TRANSFORAMINAL APPROACH Bilateral L3;  Surgeon: Shanell Cani MD;  Location: [facility];  Service: Pain Management;  Laterality: Bilateral;  Bilateral TFESI L3       History reviewed. No pertinent family history.    Social History     Socioeconomic History    Marital status:    Tobacco Use    Smoking status: Never    Smokeless  tobacco: Never   Substance and Sexual Activity    Alcohol use: Never    Drug use: Never    Sexual activity: Not Currently       Current Outpatient Medications   Medication Sig Dispense Refill    amLODIPine (NORVASC) 10 MG tablet TAKE ONE TABLET BY MOUTH EVERY DAY 30 tablet 11    ascorbic acid, vitamin C, (VITAMIN C) 1000 MG tablet Take 1,000 mg by mouth once daily.      aspirin (ECOTRIN) 81 MG EC tablet TAKE ONE TABLET BY MOUTH DAILY 30 tablet 6    atorvastatin (LIPITOR) 40 MG tablet Take 40 mg by mouth once daily.      benzonatate (TESSALON) 200 MG capsule Take 200 mg by mouth 3 (three) times daily as needed.      carvediloL (COREG) 25 MG tablet Take 1 tablet (25 mg total) by mouth 2 (two) times daily. 180 tablet 3    diphenoxylate-atropine 2.5-0.025 mg (LOMOTIL) 2.5-0.025 mg per tablet Take 1 tablet by mouth 4 (four) times daily as needed. 30 tablet 1    fluticasone propionate (FLONASE) 50 mcg/actuation nasal spray USE TWO SPRAYS IN EACH NOSTRIL DAILY 16 g 3    glimepiride (AMARYL) 2 MG tablet TAKE ONE TABLET BY MOUTH EVERY MORNING 30 tablet 11    hydroCHLOROthiazide (HYDRODIURIL) 25 MG tablet TAKE ONE TABLET BY MOUTH EVERY DAY 30 tablet 11    levocetirizine (XYZAL) 5 MG tablet See Instructions, TAKE ONE TABLET BY MOUTH EVERY EVENING, # 90 tab(s), 3 Refill(s), Pharmacy: [facility], 166, cm, Height/Length Dosing, 06/08/21 10:36:00 CDT, 109.76, kg, Weight Dosing, 12/20/21 14:14:00 CST 90 tablet 6    metFORMIN (GLUCOPHAGE) 500 MG tablet Take 500 mg by mouth.      pantoprazole (PROTONIX) 40 MG tablet TAKE ONE TABLET BY MOUTH EVERY DAY 30 tablet 6    SURE COMFORT LANCETS 28 gauge Misc CHECK BLOOD SUGAR ONCE DAILY      traZODone (DESYREL) 50 MG tablet Take 1 tablet (50 mg total) by mouth every evening. 30 tablet 11    TRUE METRIX GLUCOSE TEST STRIP Strp TEST BLOOD SUGAR ONCE DAILY      valsartan (DIOVAN) 320 MG tablet Take 1 tablet (320 mg total) by mouth once daily. 90 tablet 3    zinc 50 mg Tab Take 1 tablet by  mouth every morning.      gabapentin (NEURONTIN) 300 MG capsule Take 1 capsule (300 mg total) by mouth 3 (three) times daily. 270 capsule 1     No current facility-administered medications for this visit.       Review of patient's allergies indicates:   Allergen Reactions    Celecoxib Shortness Of Breath    Meloxicam Itching and Shortness Of Breath    Moxifloxacin Shortness Of Breath    Rofecoxib Shortness Of Breath    Mepergan fortis     Mepergan Itching    Oseltamivir Nausea Only    Oxycodone-acetaminophen Itching

## 2023-09-01 NOTE — H&P (VIEW-ONLY)
"Subjective:      Patient ID: Arelis Loya is a 78 y.o. female.    Chief Complaint: Back Pain (Preop for Stefan TFESI  L5 9/21/23. No numbness or tingling. Pain going the legs more to the left then the right.  Taking RX & OTC. Home exercise does not really help use a walker to ambulate.  Pain level today is  9/10. )    Referred by: No ref. provider found       Back Pain    Patient presents as a follow-up for pain associated with lumbar DDD and radiculopathy after completing a  transforaminal epidural steroid injection to bilateral L3 on 07/20/2023.  Unfortunately patient received 2 days of excellent pain control and now her pain has returned to the same region.  More pronounced with going down the lateral legs to the ankle.  She has pertinent past surgical history of lumbar fusion to L4/L5.    Patient continues to have low back pain with sciatica .  Pain remains located to bilateral low back, radiates to bilateral buttocks left greater than right, bilateral groin, with radiation to left anterior thigh radiating down to in her side of left knee and lateral left shin (L4 + L5).  Additionally she has pain to the right anterior  thigh (L4) the radiates lateral (L5) just before the knee.  No radiation of pain to the feet.  Her legs feel weak.  She has never had a nerve conduction study or EMG.     Reports pain feels like a burning and achy sensation it is worse with walking prolonged standing and household chores is severely limited.  These activities elevate her pain 8-9/10.  Pain reduces with sitting or lying down to a 6/10.  Denies pain waking her up at night as she takes sleep aides and reports "evil pain" waking     She has treated pain with physical therapy completed 3 weeks ago for 2-1/2 months.  This provided her no pain relief.  She continues home exercises.  In the past she completed 2 lumbar epidural steroids prior to her lumbar fusion they gave her no relief.    Scheduled for bilateral L5 transforaminal " "epidural steroid injections later this month.  She is scheduled to see Dr. Rubin for an evaluation in January.  Vital signs:   Vitals:    09/01/23 1045   BP: 129/64   Pulse: 72   Temp: 97.5 °F (36.4 °C)   Weight: 106.6 kg (235 lb)   Height: 5' 5" (1.651 m)   PainSc:   9     Body mass index is 39.11 kg/m².  Pain Disability Index (PDI): 43       Interventional Pain History  07/20/2023:  Transforaminal epidural steroid injection to bilateral L3       Review of Systems   Musculoskeletal:  Positive for back pain.   : Low back and leg pain    MRI Lumbar Spine     DISCUSSION:      There are 5 nonrib-bearing lumbar-type vertebral bodies. There is  grade 1 anterolisthesis of L5 over S1. The vertebral heights are  maintained. There are degenerative endplate changes with reactive  endplate edema at L5-S1.     The conus terminates at the level of L1. It is normal in signal and  contour.     Disc spaces, spinal canal and neural foramina are as follows:     L1-L2: Minimal disc bulge and bilateral facet hypertrophy. No  significant spinal canal or neural foraminal stenosis.      L2-L3: Disc bulge and superimposed disc protrusion measuring 3 mm in  AP dimension and facet hypertrophy which cause moderate narrowing of  the spinal canal. There is mild to moderate bilateral neural foraminal  stenosis.      L3-L4: Disc bulge and bilateral facet hypertrophy which cause mild  narrowing of the spinal canal. Mild bilateral neural foraminal  stenosis.      L4-L5: Disc bulge and central disc protrusion measuring 3 mm in AP  dimension and facet hypertrophy which cause severe spinal canal  stenosis with partial effacement of the CSF space. There is severe  right and moderate left neural foraminal stenosis.      L5-S1: Grade 1 anterolisthesis of L5 over S1. Disc height loss with  posterior marginal osteophytes and facet hypertrophy. No significant  spinal canal stenosis. Mild right and moderate left neural foraminal  stenosis.      No significant " abnormality within the visualized paraspinous  musculature. Left renal T2 hyperintensities may represent cysts.     IMPRESSION:   1.  Severe degenerative spinal canal stenosis at L4-L5, moderate at  L2-L3, mild at L3-4.  2.  Multilevel neural foraminal stenoses as described, severe on the  right at L4-L5.        Objective:          Physical Exam  General: Well developed; overweight; A&O x 3; No anxiety/depression; NAD  Mental Status: Oriented to person, palce and time. Displays appropriate mood & affect.  Head: Norm cephalic and atraumatic  Neck:  No cervical paraspinal banding.  Full range of motion with lateral turning and cervical flexion +extension.  Eyes: normal conjunctiva, normal lids, normal pupils  ENT and mouth: normal external ear, nose, and no lesions noted on the lips.  Respiratory: Symmetrical, Unlabored. No dyspnea  CV: normal rhythm and rate. No peripheral edema.   Abdomen: Non-distended    Extremities:  Gen: No cyanosis or tenderness to palpation bilateral upper and lower extremities  Skin: Warm, pink, dry, no rashes, no lesions on the lumbar spine  Strength: 5/5 motor strength bilateral upper and lower extremities  ROM: Full ROM in bilateral knees and ankles without pain or instability.    Neuro:  Gait: no altalgic lean, normal toe and heel raise. Independent ambulator.  DTR's: 2+ in bilateral patellar, and ankle  Sensory: Intact to light touch bilateral  upper and lower extremities    Spine: Normal lordosis. No scoliosis  L-spine ROM: Full ROM to flexion, extension, bilateral rotation,   Straight Leg Raise:  Positive right, positive left  SI Joint: No tenderness to palpation bilaterally.               Assessment:     Patient has ongoing bilateral low back pain and radiation down the lateral legs that is more problematic in the L5 dermatome region.  Raise during exam findings in an antalgic gait that is very guarded.  She also has a notable tenderness to the L5 region of her posterior low back when  palpating both sides of her buttock    MRI lumbar spine shows L4-L5: Disc bulge and central disc protrusion measuring 3 mm in AP dimension and facet hypertrophy which cause severe spinal canal  stenosis with partial effacement of the CSF space. There is severe  right and moderate left neural foraminal stenosis      Encounter Diagnosis   Name Primary?    Spondylolisthesis of lumbar region          Plan:       Arelis was seen today for back pain.    Diagnoses and all orders for this visit:    Spondylolisthesis of lumbar region  -     gabapentin (NEURONTIN) 300 MG capsule; Take 1 capsule (300 mg total) by mouth 3 (three) times daily.    We will proceed with bilateral L5 transforaminal epidural steroid injections as scheduled later this month.  The plan was discussed with the patient and she wishes to proceed.  I am also increasing her gabapentin up to 300 mg 3 times per day for the neuropathic component of her pain.          Past Medical History:   Diagnosis Date    Arthritis 07/05/2022    Gastroesophageal reflux disease without esophagitis 07/05/2022    Hypertension 07/05/2022    Insomnia 07/05/2022    Lumbar radiculopathy 07/05/2022    EVELIN on CPAP     Pure hypercholesterolemia 07/05/2022    Seasonal allergies 07/05/2022    Spondylolisthesis of lumbar region 07/05/2022    Type 2 diabetes mellitus with obesity 07/05/2022       Past Surgical History:   Procedure Laterality Date    back fusion Right     FESS, WITH NASAL SEPTOPLASTY      HEMORRHOID SURGERY      HYSTERECTOMY      TRANSFORAMINAL EPIDURAL INJECTION OF STEROID Bilateral 7/20/2023    Procedure: INJECTION, STEROID, EPIDURAL, TRANSFORAMINAL APPROACH Bilateral L3;  Surgeon: Shanell Cain MD;  Location: [facility];  Service: Pain Management;  Laterality: Bilateral;  Bilateral TFESI L3       History reviewed. No pertinent family history.    Social History     Socioeconomic History    Marital status:    Tobacco Use    Smoking status: Never    Smokeless  tobacco: Never   Substance and Sexual Activity    Alcohol use: Never    Drug use: Never    Sexual activity: Not Currently       Current Outpatient Medications   Medication Sig Dispense Refill    amLODIPine (NORVASC) 10 MG tablet TAKE ONE TABLET BY MOUTH EVERY DAY 30 tablet 11    ascorbic acid, vitamin C, (VITAMIN C) 1000 MG tablet Take 1,000 mg by mouth once daily.      aspirin (ECOTRIN) 81 MG EC tablet TAKE ONE TABLET BY MOUTH DAILY 30 tablet 6    atorvastatin (LIPITOR) 40 MG tablet Take 40 mg by mouth once daily.      benzonatate (TESSALON) 200 MG capsule Take 200 mg by mouth 3 (three) times daily as needed.      carvediloL (COREG) 25 MG tablet Take 1 tablet (25 mg total) by mouth 2 (two) times daily. 180 tablet 3    diphenoxylate-atropine 2.5-0.025 mg (LOMOTIL) 2.5-0.025 mg per tablet Take 1 tablet by mouth 4 (four) times daily as needed. 30 tablet 1    fluticasone propionate (FLONASE) 50 mcg/actuation nasal spray USE TWO SPRAYS IN EACH NOSTRIL DAILY 16 g 3    glimepiride (AMARYL) 2 MG tablet TAKE ONE TABLET BY MOUTH EVERY MORNING 30 tablet 11    hydroCHLOROthiazide (HYDRODIURIL) 25 MG tablet TAKE ONE TABLET BY MOUTH EVERY DAY 30 tablet 11    levocetirizine (XYZAL) 5 MG tablet See Instructions, TAKE ONE TABLET BY MOUTH EVERY EVENING, # 90 tab(s), 3 Refill(s), Pharmacy: [facility], 166, cm, Height/Length Dosing, 06/08/21 10:36:00 CDT, 109.76, kg, Weight Dosing, 12/20/21 14:14:00 CST 90 tablet 6    metFORMIN (GLUCOPHAGE) 500 MG tablet Take 500 mg by mouth.      pantoprazole (PROTONIX) 40 MG tablet TAKE ONE TABLET BY MOUTH EVERY DAY 30 tablet 6    SURE COMFORT LANCETS 28 gauge Misc CHECK BLOOD SUGAR ONCE DAILY      traZODone (DESYREL) 50 MG tablet Take 1 tablet (50 mg total) by mouth every evening. 30 tablet 11    TRUE METRIX GLUCOSE TEST STRIP Strp TEST BLOOD SUGAR ONCE DAILY      valsartan (DIOVAN) 320 MG tablet Take 1 tablet (320 mg total) by mouth once daily. 90 tablet 3    zinc 50 mg Tab Take 1 tablet by  mouth every morning.      gabapentin (NEURONTIN) 300 MG capsule Take 1 capsule (300 mg total) by mouth 3 (three) times daily. 270 capsule 1     No current facility-administered medications for this visit.       Review of patient's allergies indicates:   Allergen Reactions    Celecoxib Shortness Of Breath    Meloxicam Itching and Shortness Of Breath    Moxifloxacin Shortness Of Breath    Rofecoxib Shortness Of Breath    Mepergan fortis     Mepergan Itching    Oseltamivir Nausea Only    Oxycodone-acetaminophen Itching

## 2023-09-11 NOTE — DISCHARGE INSTRUCTIONS
EPIDURAL STEROID INJECTION, CARE AFTER      NO HEAVY LIFTING FOR ONE WEEK  NO HEAT FOR 24 HOURS  APPLY ICE PACK FOR COMFORT TO SITES  REMOVE BAND-AIDS TOMORROW AND THEN YOU MAY SHOWER  NO TUB SOAKING FOR 3-5 DAYS    These instructions give you information on caring for yourself after your procedure. Your doctor may also give you specific instructions. Call your doctor if you have any problems or questions after your procedure.     HOME CARE  Do not drive or use any machinery for the next 24 hours.  Do not do any hard physical activity for the next 24 hours  Do not use a heating pad in area of injection  You may go back to eating as usual    SIDE EFFECTS THAT COULD HAPPEN UP TO 4 HOURS AFTER THE INJECTION  If you have leg weakness or numbness, have someone help you walk. If the weakness or numbness does not go away, or if it gets worse go to the emergency department.  If you have dizziness, lie down right away. This usually helps. Sit up slowly and then when you have been sitting for a few minutes then stand up.  If you have a mild headache, drink fluids (especially drinks with caffeine) Call your doctor if the headache gets worse or persists.  When the numbing medicine wears off you may feel some discomfort where you had the shot. It usually only lasts for a few days. You may put ice over the injection site. Leave ice on for 20 minutes at a time and protect your skin during use.   You may feel minor back pain and stiffness at the site of the shot. Call your doctor if this pain gets worse or does not improve. You may feel nauseous or vomit several hours after your procedure. If this happens, try drinking small amounts of clear liquids until you feel better. If you continue to feel nauseated or continue vomiting, get help right away.    Steroids may take several days to start working. The shot usually helps leg pain more than back pain. The shot will not fix what is causing the pain but may take away some of the pain.  This pain relief may last from 2 weeks to 6 months.     GET HELP RIGHT AWAY IF:  You have very bad pain, headache or neck pain or stiffness  There is a change in your vision (double or blurry vision)  You have a fever over 101 or chills  You have swelling or redness at the injection site  You get weaker  You are not able to control your bladder or bowels  You are not able to urinate

## 2023-09-21 ENCOUNTER — HOSPITAL ENCOUNTER (OUTPATIENT)
Facility: HOSPITAL | Age: 78
Discharge: HOME | End: 2023-09-21
Attending: ANESTHESIOLOGY | Admitting: ANESTHESIOLOGY
Payer: MEDICARE

## 2023-09-21 ENCOUNTER — ANESTHESIA EVENT (OUTPATIENT)
Dept: SURGERY | Facility: HOSPITAL | Age: 78
End: 2023-09-21
Payer: MEDICARE

## 2023-09-21 ENCOUNTER — ANESTHESIA (OUTPATIENT)
Dept: SURGERY | Facility: HOSPITAL | Age: 78
End: 2023-09-21
Payer: MEDICARE

## 2023-09-21 DIAGNOSIS — M54.9 CHRONIC BACK PAIN GREATER THAN 3 MONTHS DURATION: ICD-10-CM

## 2023-09-21 DIAGNOSIS — G89.29 CHRONIC BACK PAIN GREATER THAN 3 MONTHS DURATION: ICD-10-CM

## 2023-09-21 LAB — POCT GLUCOSE: 145 MG/DL (ref 70–110)

## 2023-09-21 PROCEDURE — 37000008 HC ANESTHESIA 1ST 15 MINUTES: Performed by: ANESTHESIOLOGY

## 2023-09-21 PROCEDURE — 25000003 PHARM REV CODE 250: Performed by: NURSE ANESTHETIST, CERTIFIED REGISTERED

## 2023-09-21 PROCEDURE — 63600175 PHARM REV CODE 636 W HCPCS: Performed by: NURSE ANESTHETIST, CERTIFIED REGISTERED

## 2023-09-21 PROCEDURE — D9220A PRA ANESTHESIA: Mod: ,,, | Performed by: NURSE ANESTHETIST, CERTIFIED REGISTERED

## 2023-09-21 PROCEDURE — 64483 NJX AA&/STRD TFRM EPI L/S 1: CPT | Mod: 50 | Performed by: ANESTHESIOLOGY

## 2023-09-21 PROCEDURE — D9220A PRA ANESTHESIA: ICD-10-PCS | Mod: ,,, | Performed by: NURSE ANESTHETIST, CERTIFIED REGISTERED

## 2023-09-21 PROCEDURE — 63600175 PHARM REV CODE 636 W HCPCS: Performed by: ANESTHESIOLOGY

## 2023-09-21 PROCEDURE — 64483 NJX AA&/STRD TFRM EPI L/S 1: CPT | Mod: 50,,, | Performed by: ANESTHESIOLOGY

## 2023-09-21 PROCEDURE — 64483 PR EPIDURAL INJ, ANES/STEROID, TRANSFORAMINAL, LUMB/SACR, SNGL LEVL: ICD-10-PCS | Mod: 50,,, | Performed by: ANESTHESIOLOGY

## 2023-09-21 PROCEDURE — 25000003 PHARM REV CODE 250: Performed by: ANESTHESIOLOGY

## 2023-09-21 RX ORDER — BUPIVACAINE HYDROCHLORIDE 2.5 MG/ML
INJECTION, SOLUTION EPIDURAL; INFILTRATION; INTRACAUDAL
Status: DISCONTINUED | OUTPATIENT
Start: 2023-09-21 | End: 2023-09-21 | Stop reason: HOSPADM

## 2023-09-21 RX ORDER — LIDOCAINE HYDROCHLORIDE 10 MG/ML
INJECTION, SOLUTION EPIDURAL; INFILTRATION; INTRACAUDAL; PERINEURAL
Status: DISCONTINUED | OUTPATIENT
Start: 2023-09-21 | End: 2023-09-21

## 2023-09-21 RX ORDER — LIDOCAINE HYDROCHLORIDE 10 MG/ML
INJECTION, SOLUTION EPIDURAL; INFILTRATION; INTRACAUDAL; PERINEURAL
Status: DISCONTINUED
Start: 2023-09-21 | End: 2023-09-21 | Stop reason: HOSPADM

## 2023-09-21 RX ORDER — DEXAMETHASONE SODIUM PHOSPHATE 10 MG/ML
INJECTION INTRAMUSCULAR; INTRAVENOUS
Status: DISCONTINUED | OUTPATIENT
Start: 2023-09-21 | End: 2023-09-21 | Stop reason: HOSPADM

## 2023-09-21 RX ORDER — PROPOFOL 10 MG/ML
VIAL (ML) INTRAVENOUS
Status: DISCONTINUED | OUTPATIENT
Start: 2023-09-21 | End: 2023-09-21

## 2023-09-21 RX ORDER — LIDOCAINE HYDROCHLORIDE 10 MG/ML
INJECTION, SOLUTION EPIDURAL; INFILTRATION; INTRACAUDAL; PERINEURAL
Status: DISCONTINUED | OUTPATIENT
Start: 2023-09-21 | End: 2023-09-21 | Stop reason: HOSPADM

## 2023-09-21 RX ORDER — DEXAMETHASONE SODIUM PHOSPHATE 10 MG/ML
INJECTION INTRAMUSCULAR; INTRAVENOUS
Status: DISCONTINUED
Start: 2023-09-21 | End: 2023-09-21 | Stop reason: HOSPADM

## 2023-09-21 RX ADMIN — PROPOFOL 70 MG: 10 INJECTION, EMULSION INTRAVENOUS at 10:09

## 2023-09-21 RX ADMIN — LIDOCAINE HYDROCHLORIDE 20 MG: 10 INJECTION, SOLUTION EPIDURAL; INFILTRATION; INTRACAUDAL; PERINEURAL at 10:09

## 2023-09-21 NOTE — TRANSFER OF CARE
Anesthesia Transfer of Care Note    Patient: Arelis Loya    Procedure(s) Performed: Procedure(s) (LRB):  INJECTION, STEROID, EPIDURAL, TRANSFORAMINAL APPROACH Bilateral L5 (Bilateral)    Patient location: PACU    Anesthesia Type: MAC    Transport from OR: Transported from OR on room air with adequate spontaneous ventilation    Post pain: adequate analgesia    Post assessment: no apparent anesthetic complications    Post vital signs: stable    Level of consciousness: awake    Nausea/Vomiting: no nausea/vomiting    Complications: none    Transfer of care protocol was followed

## 2023-09-21 NOTE — DISCHARGE SUMMARY
[facility] - Periop Services  Discharge Note  Short Stay    Procedure(s) (LRB):  INJECTION, STEROID, EPIDURAL, TRANSFORAMINAL APPROACH Bilateral L5 (Bilateral)      OUTCOME: Patient tolerated treatment/procedure well without complication and is now ready for discharge.    DISPOSITION: Home or Self Care    FINAL DIAGNOSIS:  <principal problem not specified>    FOLLOWUP: In clinic    DISCHARGE INSTRUCTIONS:  No discharge procedures on file.     TIME SPENT ON DISCHARGE: 5 minutes

## 2023-09-21 NOTE — ANESTHESIA POSTPROCEDURE EVALUATION
Anesthesia Post Evaluation    Patient: Arelis Loya    Procedure(s) Performed: Procedure(s) (LRB):  INJECTION, STEROID, EPIDURAL, TRANSFORAMINAL APPROACH Bilateral L5 (Bilateral)    Final Anesthesia Type: MAC      Patient location during evaluation: PACU  Patient participation: Yes- Able to Participate  Level of consciousness: awake and alert  Post-procedure vital signs: reviewed and stable  Pain management: adequate  Airway patency: patent    PONV status at discharge: No PONV  Anesthetic complications: no      Cardiovascular status: blood pressure returned to baseline and stable  Respiratory status: unassisted  Hydration status: euvolemic  Follow-up not needed.              No case tracking events are documented in the log.      Pain/Iva Score: No data recorded

## 2023-09-21 NOTE — ANESTHESIA PREPROCEDURE EVALUATION
09/21/2023  Arelis Loya is a 78 y.o., female.    Other Medical History   Lumbar radiculopathy Seasonal allergies   Hypertension Pure hypercholesterolemia   Type 2 diabetes mellitus with obesity Gastroesophageal reflux disease without esophagitis   Arthritis Spondylolisthesis of lumbar region   Insomnia EVEILN on CPAP     Surgical History  HYSTERECTOMY back fusion   FESS, WITH NASAL SEPTOPLASTY HEMORRHOID SURGERY   TRANSFORAMINAL EPIDURAL INJECTION OF STEROID      Pre-op Assessment    I have reviewed the Patient Summary Reports.     I have reviewed the Nursing Notes. I have reviewed the NPO Status.   I have reviewed the Medications.     Review of Systems  Anesthesia Hx:  No problems with previous Anesthesia    Social:  Non-Smoker    Cardiovascular:   Hypertension    Pulmonary:   Sleep Apnea    Hepatic/GI:   GERD    Neurological:   Neuromuscular Disease,    Endocrine:   Diabetes  Obesity / BMI > 30      Physical Exam  General: Well nourished, Cooperative, Alert and Oriented    Airway:  Mallampati: III   Mouth Opening: Normal  TM Distance: Normal  Tongue: Normal  Neck ROM: Normal ROM    Dental:  Intact        Anesthesia Plan  Type of Anesthesia, risks & benefits discussed:    Anesthesia Type: MAC  Intra-op Monitoring Plan: Standard ASA Monitors  Post Op Pain Control Plan: multimodal analgesia  Induction:  IV  Airway Plan: Direct  Informed Consent: Informed consent signed with the Patient and all parties understand the risks and agree with anesthesia plan.  All questions answered.   ASA Score: 3  Day of Surgery Review of History & Physical: H&P Update referred to the surgeon/provider.    Ready For Surgery From Anesthesia Perspective.     .

## 2023-09-21 NOTE — OP NOTE
Procedure:    Left L5  transforaminal epidural steroid injection    Right L5  transforaminal epidural steroid injection    Pre-Procedure Diagnoses:  Chronic back pain greater than 3 months  Lumbar degenerative disc disease  Lumbar radiculopathy  Lumbar disc displacement    Post-Procedure Diagnoses:  Chronic back pain greater than 3 months  Lumbar degenerative disc disease  Lumbar radiculopathy  Lumbar disc displacement    Anesthesia:  Local and MAC    Estimated Blood Loss:  < 2 ML    Consent:  The procedure, risks, benefits, and alternatives were discussed with the patient.  The patient voiced understanding and fully informed written consent was obtained.    Description of the Procedure:  The patient was taken to the operating room and placed in the prone position. The skin overlying the lumbar spine was prepped with Chloraprep and draped in the usual sterile fashion.  An oblique fluoroscopic view was obtained on the left side at L5, with the superior articular process of the sacral ala aligned with the pedicle.  Skin anesthesia was achieved using 2 mL of lidocaine 1%.  A 22-gauge 5 -inch Quinke spinal needle was inserted and advanced under intermittent fluoroscopic views into the epidural space. Proper needle position was confirmed under AP, oblique, and lateral fluoroscopic views.  Negative aspiration for blood or CSF was confirmed. 1 mL of contrast was injected, which revealed spread into the epidural space.  Then a combination of 5 mg of dexamethasone with 1 mL of 0.25% bupivacaine was easily injected.   There was no pain on injection. The needle was removed intact and bleeding was nil.  The same procedure was repeated in identical fashion on the right side at L5 .  Sterile bandages were applied. The patient was taken to the recovery room for further observation in stable condition. The patient was then discharged home with no complications.

## 2023-09-22 VITALS
TEMPERATURE: 99 F | OXYGEN SATURATION: 95 % | HEIGHT: 65 IN | DIASTOLIC BLOOD PRESSURE: 76 MMHG | WEIGHT: 229.06 LBS | HEART RATE: 71 BPM | BODY MASS INDEX: 38.16 KG/M2 | SYSTOLIC BLOOD PRESSURE: 149 MMHG

## 2023-10-26 ENCOUNTER — OFFICE VISIT (OUTPATIENT)
Dept: PAIN MEDICINE | Facility: CLINIC | Age: 78
End: 2023-10-26
Payer: MEDICARE

## 2023-10-26 VITALS
BODY MASS INDEX: 37.15 KG/M2 | HEIGHT: 65 IN | WEIGHT: 223 LBS | DIASTOLIC BLOOD PRESSURE: 86 MMHG | SYSTOLIC BLOOD PRESSURE: 160 MMHG | HEART RATE: 65 BPM

## 2023-10-26 DIAGNOSIS — M51.26 DISC DISPLACEMENT, LUMBAR: ICD-10-CM

## 2023-10-26 DIAGNOSIS — G89.29 CHRONIC BACK PAIN GREATER THAN 3 MONTHS DURATION: ICD-10-CM

## 2023-10-26 DIAGNOSIS — M54.16 LUMBAR RADICULOPATHY: Primary | Chronic | ICD-10-CM

## 2023-10-26 DIAGNOSIS — M54.9 CHRONIC BACK PAIN GREATER THAN 3 MONTHS DURATION: ICD-10-CM

## 2023-10-26 PROCEDURE — 99215 PR OFFICE/OUTPT VISIT, EST, LEVL V, 40-54 MIN: ICD-10-PCS | Mod: ,,, | Performed by: ANESTHESIOLOGY

## 2023-10-26 PROCEDURE — 99215 OFFICE O/P EST HI 40 MIN: CPT | Mod: ,,, | Performed by: ANESTHESIOLOGY

## 2023-10-26 NOTE — PROGRESS NOTES
"Subjective:      Patient ID: Arelis Loya is a 78 y.o. female.    Chief Complaint: Low-back Pain (Post injection TFESI Stefan L5 09/21/23 pt states injection helped 2-3 days. C/O pain level 8, Taking Gabapentin for pain. Pt states having lt sided pain today. Pt ambulating with a walker.)    Referred by: No ref. provider found       Back Pain    Low-back Pain    Patient presents as a follow-up for pain associated with lumbar DDD and radiculopathy after completing a transforaminal epidural steroid injection to bilateral L3 on 07/20/2023.  Unfortunately patient received 2 days of excellent pain control and now her pain has returned to the same region.  More pronounced with going down the lateral legs to the ankle.  She has pertinent past surgical history of lumbar fusion to L4/L5.  She underwent bilateral L5 transforaminal epidural steroid injections a couple weeks ago, but states that she only had relief for a couple of days.    Patient continues to have low back pain with sciatica .  Pain remains located to bilateral low back, radiates to bilateral buttocks left greater than right, bilateral groin, with radiation to left anterior thigh radiating down to in her side of left knee and lateral left shin (L4 + L5).  Additionally she has pain to the right anterior  thigh (L4) the radiates lateral (L5) just before the knee.  No radiation of pain to the feet.  Her legs feel weak.  She has never had a nerve conduction study or EMG.     Reports pain feels like a burning and achy sensation it is worse with walking prolonged standing and household chores is severely limited.  These activities elevate her pain 8-9/10.  Pain reduces with sitting or lying down to a 6/10.  Denies pain waking her up at night as she takes sleep aides and reports "evil pain" waking     She has treated pain with physical therapy completed 3 weeks ago for 2-1/2 months.  This provided her no pain relief.  She continues home exercises.  In the past she " "completed 2 lumbar epidural steroids prior to her lumbar fusion they gave her no relief.    She is scheduled to see Dr. Rubin for an evaluation in January.  Vital signs:   Vitals:    10/26/23 1457 10/26/23 1458   BP: (!) 160/86    Pulse: 65    Weight: 101.2 kg (223 lb)    Height: 5' 5" (1.651 m)    PainSc:    8     Body mass index is 37.11 kg/m².  Pain Disability Index (PDI): 51       Interventional Pain History  07/20/2023:  Transforaminal epidural steroid injection to bilateral L3       Review of Systems   Musculoskeletal:  Positive for back pain.   : Low back and leg pain    MRI Lumbar Spine     DISCUSSION:      There are 5 nonrib-bearing lumbar-type vertebral bodies. There is  grade 1 anterolisthesis of L5 over S1. The vertebral heights are  maintained. There are degenerative endplate changes with reactive  endplate edema at L5-S1.     The conus terminates at the level of L1. It is normal in signal and  contour.     Disc spaces, spinal canal and neural foramina are as follows:     L1-L2: Minimal disc bulge and bilateral facet hypertrophy. No  significant spinal canal or neural foraminal stenosis.      L2-L3: Disc bulge and superimposed disc protrusion measuring 3 mm in  AP dimension and facet hypertrophy which cause moderate narrowing of  the spinal canal. There is mild to moderate bilateral neural foraminal  stenosis.      L3-L4: Disc bulge and bilateral facet hypertrophy which cause mild  narrowing of the spinal canal. Mild bilateral neural foraminal  stenosis.      L4-L5: Disc bulge and central disc protrusion measuring 3 mm in AP  dimension and facet hypertrophy which cause severe spinal canal  stenosis with partial effacement of the CSF space. There is severe  right and moderate left neural foraminal stenosis.      L5-S1: Grade 1 anterolisthesis of L5 over S1. Disc height loss with  posterior marginal osteophytes and facet hypertrophy. No significant  spinal canal stenosis. Mild right and moderate left neural " foraminal  stenosis.      No significant abnormality within the visualized paraspinous  musculature. Left renal T2 hyperintensities may represent cysts.     IMPRESSION:   1.  Severe degenerative spinal canal stenosis at L4-L5, moderate at  L2-L3, mild at L3-4.  2.  Multilevel neural foraminal stenoses as described, severe on the  right at L4-L5.        Objective:          Physical Exam  General: Well developed; overweight; A&O x 3; No anxiety/depression; NAD  Mental Status: Oriented to person, palce and time. Displays appropriate mood & affect.  Head: Norm cephalic and atraumatic  Neck:  No cervical paraspinal banding.  Full range of motion with lateral turning and cervical flexion +extension.  Eyes: normal conjunctiva, normal lids, normal pupils  ENT and mouth: normal external ear, nose, and no lesions noted on the lips.  Respiratory: Symmetrical, Unlabored. No dyspnea  CV: normal rhythm and rate. No peripheral edema.   Abdomen: Non-distended    Extremities:  Gen: No cyanosis or tenderness to palpation bilateral upper and lower extremities  Skin: Warm, pink, dry, no rashes, no lesions on the lumbar spine  Strength: 5/5 motor strength bilateral upper and lower extremities  ROM: Full ROM in bilateral knees and ankles without pain or instability.    Neuro:  Gait: no altalgic lean, normal toe and heel raise. Independent ambulator.  DTR's: 2+ in bilateral patellar, and ankle  Sensory: Intact to light touch bilateral  upper and lower extremities    Spine: Normal lordosis. No scoliosis  L-spine ROM: Full ROM to flexion, extension, bilateral rotation,   Straight Leg Raise:  Positive right, positive left  SI Joint: No tenderness to palpation bilaterally.               Assessment:     Patient has ongoing bilateral low back pain and radiation down the lateral legs that is more problematic in the L5 dermatome region.  Raise during exam findings in an antalgic gait that is very guarded.  She also has a notable tenderness to the  L5 region of her posterior low back when palpating both sides of her buttock    MRI lumbar spine shows L4-L5: Disc bulge and central disc protrusion measuring 3 mm in AP dimension and facet hypertrophy which cause severe spinal canal  stenosis with partial effacement of the CSF space. There is severe  right and moderate left neural foraminal stenosis      Encounter Diagnoses   Name Primary?    Lumbar radiculopathy Yes    Chronic back pain greater than 3 months duration     Disc displacement, lumbar          Plan:       Arelis was seen today for low-back pain.    Diagnoses and all orders for this visit:    Lumbar radiculopathy    Chronic back pain greater than 3 months duration    Disc displacement, lumbar    We discussed spinal cord stimulation today.  I provided her with a travayl handout.  She is going to consider this option.  She is also scheduled for an evaluation with Dr. Rubin in a couple of months.  She will call back once she decides what she would like to do.      Past Medical History:   Diagnosis Date    Arthritis 07/05/2022    Gastroesophageal reflux disease without esophagitis 07/05/2022    Hypertension 07/05/2022    Insomnia 07/05/2022    Lumbar radiculopathy 07/05/2022    EVELIN on CPAP     Pure hypercholesterolemia 07/05/2022    Seasonal allergies 07/05/2022    Spondylolisthesis of lumbar region 07/05/2022    Type 2 diabetes mellitus with obesity 07/05/2022       Past Surgical History:   Procedure Laterality Date    back fusion Right     FESS, WITH NASAL SEPTOPLASTY      HEMORRHOID SURGERY      HYSTERECTOMY      TRANSFORAMINAL EPIDURAL INJECTION OF STEROID Bilateral 7/20/2023    Procedure: INJECTION, STEROID, EPIDURAL, TRANSFORAMINAL APPROACH Bilateral L3;  Surgeon: Shanell Cain MD;  Location[facility];  Service: Pain Management;  Laterality: Bilateral;  Bilateral TFESI L3    TRANSFORAMINAL EPIDURAL INJECTION OF STEROID Bilateral 9/21/2023    Procedure: INJECTION, STEROID, EPIDURAL,  TRANSFORAMINAL APPROACH Bilateral L5;  Surgeon: Shanell Cain MD;  Location: [facility] OR;  Service: Pain Management;  Laterality: Bilateral;  Bilateral TFESI L5       History reviewed. No pertinent family history.    Social History     Socioeconomic History    Marital status:    Tobacco Use    Smoking status: Never    Smokeless tobacco: Never   Substance and Sexual Activity    Alcohol use: Never    Drug use: Never    Sexual activity: Not Currently       Current Outpatient Medications   Medication Sig Dispense Refill    amLODIPine (NORVASC) 10 MG tablet TAKE ONE TABLET BY MOUTH EVERY DAY 30 tablet 11    ascorbic acid, vitamin C, (VITAMIN C) 1000 MG tablet Take 1,000 mg by mouth once daily.      aspirin (ECOTRIN) 81 MG EC tablet TAKE ONE TABLET BY MOUTH DAILY 30 tablet 6    atorvastatin (LIPITOR) 40 MG tablet Take 40 mg by mouth once daily.      benzonatate (TESSALON) 200 MG capsule Take 200 mg by mouth 3 (three) times daily as needed.      carvediloL (COREG) 25 MG tablet Take 1 tablet (25 mg total) by mouth 2 (two) times daily. 180 tablet 3    diphenoxylate-atropine 2.5-0.025 mg (LOMOTIL) 2.5-0.025 mg per tablet TAKE ONE TABLET BY MOUTH FOUR TIMES DAILY AS NEEDED 30 tablet 1    fluticasone propionate (FLONASE) 50 mcg/actuation nasal spray USE TWO SPRAYS IN EACH NOSTRIL DAILY 16 g 3    gabapentin (NEURONTIN) 300 MG capsule Take 1 capsule (300 mg total) by mouth 3 (three) times daily. 270 capsule 1    glimepiride (AMARYL) 2 MG tablet TAKE ONE TABLET BY MOUTH EVERY MORNING 30 tablet 11    hydroCHLOROthiazide (HYDRODIURIL) 25 MG tablet TAKE ONE TABLET BY MOUTH EVERY DAY 30 tablet 11    levocetirizine (XYZAL) 5 MG tablet See Instructions, TAKE ONE TABLET BY MOUTH EVERY EVENING, # 90 tab(s), 3 Refill(s), Pharmacy: [facility], 166, cm, Height/Length Dosing, 06/08/21 10:36:00 CDT, 109.76, kg, Weight Dosing, 12/20/21 14:14:00 CST 90 tablet 6    metFORMIN (GLUCOPHAGE) 500 MG tablet Take 500 mg by mouth.       pantoprazole (PROTONIX) 40 MG tablet TAKE ONE TABLET BY MOUTH EVERY DAY 30 tablet 6    SURE COMFORT LANCETS 28 gauge Misc CHECK BLOOD SUGAR ONCE DAILY      traZODone (DESYREL) 50 MG tablet Take 1 tablet (50 mg total) by mouth every evening. 30 tablet 11    TRUE METRIX GLUCOSE TEST STRIP Strp TEST BLOOD SUGAR ONCE DAILY      valsartan (DIOVAN) 320 MG tablet Take 1 tablet (320 mg total) by mouth once daily. 90 tablet 3    zinc 50 mg Tab Take 1 tablet by mouth every morning.       No current facility-administered medications for this visit.       Review of patient's allergies indicates:   Allergen Reactions    Celecoxib Shortness Of Breath    Meloxicam Itching and Shortness Of Breath    Moxifloxacin Shortness Of Breath    Rofecoxib Shortness Of Breath    Mepergan fortis     Mepergan Itching    Oseltamivir Nausea Only    Oxycodone-acetaminophen Itching

## 2023-10-31 ENCOUNTER — TELEPHONE (OUTPATIENT)
Dept: NEUROSURGERY | Facility: CLINIC | Age: 78
End: 2023-10-31
Payer: COMMERCIAL

## 2023-10-31 NOTE — TELEPHONE ENCOUNTER
I tried to call the patient to inquire about any recent testing she may have had. She did not answer and I was unable to leave a vm.

## 2023-11-02 NOTE — TELEPHONE ENCOUNTER
The patient returned my call after hours. I did get a message from the answering service that she was returning my call. I spoke with the patient to inquire about any recent testing she may have had. She stated the last MRI she has had was the one from 9/2022. I did advise her that I can get her scheduled with our NP or PA to get worked up and we can schedule an appointment with Dr. Rubin after the work up has been completed. She verbalized understanding and agreed to do that. I did r/s her appointment with Kimi for 11/9/23 at 3:00.

## 2024-01-24 PROBLEM — M51.26 DISC DISPLACEMENT, LUMBAR: Chronic | Status: ACTIVE | Noted: 2023-10-26

## 2024-01-24 PROBLEM — G89.29 CHRONIC BACK PAIN GREATER THAN 3 MONTHS DURATION: Chronic | Status: ACTIVE | Noted: 2023-10-26

## 2024-01-24 PROBLEM — M54.9 CHRONIC BACK PAIN GREATER THAN 3 MONTHS DURATION: Chronic | Status: ACTIVE | Noted: 2023-10-26

## 2024-03-12 ENCOUNTER — HOSPITAL ENCOUNTER (EMERGENCY)
Facility: HOSPITAL | Age: 79
Discharge: ANOTHER HOSPITAL | End: 2024-03-12
Attending: EMERGENCY MEDICINE
Payer: MEDICARE

## 2024-03-12 VITALS
RESPIRATION RATE: 22 BRPM | HEIGHT: 65 IN | WEIGHT: 240 LBS | OXYGEN SATURATION: 97 % | SYSTOLIC BLOOD PRESSURE: 128 MMHG | BODY MASS INDEX: 39.99 KG/M2 | DIASTOLIC BLOOD PRESSURE: 54 MMHG | TEMPERATURE: 99 F | HEART RATE: 81 BPM

## 2024-03-12 DIAGNOSIS — M51.36 DDD (DEGENERATIVE DISC DISEASE), LUMBAR: ICD-10-CM

## 2024-03-12 DIAGNOSIS — R53.1 WEAKNESS: ICD-10-CM

## 2024-03-12 DIAGNOSIS — G89.18 ACUTE POST-OPERATIVE PAIN: Primary | ICD-10-CM

## 2024-03-12 LAB
ALBUMIN SERPL-MCNC: 3.4 G/DL (ref 3.4–4.8)
ALBUMIN/GLOB SERPL: 1.1 RATIO (ref 1.1–2)
ALP SERPL-CCNC: 60 UNIT/L (ref 40–150)
ALT SERPL-CCNC: 8 UNIT/L (ref 0–55)
APTT PPP: 29.1 SECONDS (ref 23.2–33.7)
AST SERPL-CCNC: 15 UNIT/L (ref 5–34)
BASOPHILS # BLD AUTO: 0.07 X10(3)/MCL
BASOPHILS NFR BLD AUTO: 0.5 %
BILIRUB SERPL-MCNC: 0.5 MG/DL
BUN SERPL-MCNC: 6.5 MG/DL (ref 9.8–20.1)
CALCIUM SERPL-MCNC: 8.7 MG/DL (ref 8.4–10.2)
CHLORIDE SERPL-SCNC: 103 MMOL/L (ref 98–107)
CO2 SERPL-SCNC: 28 MMOL/L (ref 23–31)
CREAT SERPL-MCNC: 0.79 MG/DL (ref 0.55–1.02)
EOSINOPHIL # BLD AUTO: 0.02 X10(3)/MCL (ref 0–0.9)
EOSINOPHIL NFR BLD AUTO: 0.2 %
ERYTHROCYTE [DISTWIDTH] IN BLOOD BY AUTOMATED COUNT: 16.6 % (ref 11.5–17)
GFR SERPLBLD CREATININE-BSD FMLA CKD-EPI: >60 MLS/MIN/1.73/M2
GLOBULIN SER-MCNC: 3.2 GM/DL (ref 2.4–3.5)
GLUCOSE SERPL-MCNC: 144 MG/DL (ref 82–115)
HCT VFR BLD AUTO: 36.4 % (ref 37–47)
HGB BLD-MCNC: 11.6 G/DL (ref 12–16)
IMM GRANULOCYTES # BLD AUTO: 0.08 X10(3)/MCL (ref 0–0.04)
IMM GRANULOCYTES NFR BLD AUTO: 0.6 %
INR PPP: 1.2
LYMPHOCYTES # BLD AUTO: 1.96 X10(3)/MCL (ref 0.6–4.6)
LYMPHOCYTES NFR BLD AUTO: 15.1 %
MCH RBC QN AUTO: 26 PG (ref 27–31)
MCHC RBC AUTO-ENTMCNC: 31.9 G/DL (ref 33–36)
MCV RBC AUTO: 81.6 FL (ref 80–94)
MONOCYTES # BLD AUTO: 1.35 X10(3)/MCL (ref 0.1–1.3)
MONOCYTES NFR BLD AUTO: 10.4 %
NEUTROPHILS # BLD AUTO: 9.54 X10(3)/MCL (ref 2.1–9.2)
NEUTROPHILS NFR BLD AUTO: 73.2 %
NRBC BLD AUTO-RTO: 0 %
PLATELET # BLD AUTO: 227 X10(3)/MCL (ref 130–400)
PMV BLD AUTO: 9 FL (ref 7.4–10.4)
POTASSIUM SERPL-SCNC: 3.4 MMOL/L (ref 3.5–5.1)
PROT SERPL-MCNC: 6.6 GM/DL (ref 5.8–7.6)
PROTHROMBIN TIME: 14.8 SECONDS (ref 12.5–14.5)
RBC # BLD AUTO: 4.46 X10(6)/MCL (ref 4.2–5.4)
SODIUM SERPL-SCNC: 139 MMOL/L (ref 136–145)
WBC # SPEC AUTO: 13.02 X10(3)/MCL (ref 4.5–11.5)

## 2024-03-12 PROCEDURE — 85610 PROTHROMBIN TIME: CPT | Performed by: EMERGENCY MEDICINE

## 2024-03-12 PROCEDURE — 85730 THROMBOPLASTIN TIME PARTIAL: CPT | Performed by: EMERGENCY MEDICINE

## 2024-03-12 PROCEDURE — 63600175 PHARM REV CODE 636 W HCPCS: Performed by: EMERGENCY MEDICINE

## 2024-03-12 PROCEDURE — 96376 TX/PRO/DX INJ SAME DRUG ADON: CPT

## 2024-03-12 PROCEDURE — 96374 THER/PROPH/DIAG INJ IV PUSH: CPT

## 2024-03-12 PROCEDURE — 96375 TX/PRO/DX INJ NEW DRUG ADDON: CPT

## 2024-03-12 PROCEDURE — 85025 COMPLETE CBC W/AUTO DIFF WBC: CPT | Performed by: EMERGENCY MEDICINE

## 2024-03-12 PROCEDURE — 80053 COMPREHEN METABOLIC PANEL: CPT | Performed by: EMERGENCY MEDICINE

## 2024-03-12 PROCEDURE — 99285 EMERGENCY DEPT VISIT HI MDM: CPT | Mod: 25

## 2024-03-12 RX ORDER — MORPHINE SULFATE 4 MG/ML
2 INJECTION, SOLUTION INTRAMUSCULAR; INTRAVENOUS
Status: COMPLETED | OUTPATIENT
Start: 2024-03-12 | End: 2024-03-12

## 2024-03-12 RX ORDER — ONDANSETRON HYDROCHLORIDE 2 MG/ML
4 INJECTION, SOLUTION INTRAVENOUS
Status: COMPLETED | OUTPATIENT
Start: 2024-03-12 | End: 2024-03-12

## 2024-03-12 RX ADMIN — ONDANSETRON 4 MG: 2 INJECTION INTRAMUSCULAR; INTRAVENOUS at 06:03

## 2024-03-12 RX ADMIN — ONDANSETRON 4 MG: 2 INJECTION INTRAMUSCULAR; INTRAVENOUS at 09:03

## 2024-03-12 RX ADMIN — MORPHINE SULFATE 2 MG: 4 INJECTION, SOLUTION INTRAMUSCULAR; INTRAVENOUS at 09:03

## 2024-03-12 RX ADMIN — MORPHINE SULFATE 2 MG: 4 INJECTION, SOLUTION INTRAMUSCULAR; INTRAVENOUS at 06:03

## 2024-03-12 NOTE — ED PROVIDER NOTES
Encounter Date: 3/12/2024       History     Chief Complaint   Patient presents with    Back Pain     Had laminectomy done yesterday at outpatient ambulatory clinic by Dr Valdez Fair. Pt c/o pain near sx site. Told to come to ED for eval per Dr Fair. Denies injury/fall. Reports taking hydrocodone around 1300. Neuro intact.     78-year-old female with history of lumbar DDD with radiculopathy presenting for evaluation of postoperative pain.  Patient had a minimally invasive, 2 level laminectomy, L2-L4, yesterday with Dr. Fair. She was able to ambulate after the procedure, pain was controlled, and she went home without issue. Overnight, the pain was severe. She is having difficulty getting out of bed due to the pain, having to lift her legs manually. At baseline, she is ambulatory with a walker, lives at home alone. She is taking Norco for the pain without relief. Notably, she is able to tell when she needs to urinate, unable to make it to the bathroom on time. No bowel incontinence, no saddle anesthesia. No fever, no other acute issues.     The history is provided by the patient.     Review of patient's allergies indicates:   Allergen Reactions    Celecoxib Shortness Of Breath    Meloxicam Itching and Shortness Of Breath    Moxifloxacin Shortness Of Breath    Rofecoxib Shortness Of Breath    Mepergan fortis     Mepergan Itching    Oseltamivir Nausea Only    Oxycodone-acetaminophen Itching     Past Medical History:   Diagnosis Date    Arthritis 07/05/2022    Gastroesophageal reflux disease without esophagitis 07/05/2022    Hypertension 07/05/2022    Insomnia 07/05/2022    Lumbar radiculopathy 07/05/2022    EVELIN on CPAP     Pure hypercholesterolemia 07/05/2022    Seasonal allergies 07/05/2022    Spondylolisthesis of lumbar region 07/05/2022    Type 2 diabetes mellitus with obesity 07/05/2022     Past Surgical History:   Procedure Laterality Date    FESS, WITH NASAL SEPTOPLASTY      HEMORRHOID SURGERY       HYSTERECTOMY      LUMBAR FUSION      TRANSFORAMINAL EPIDURAL INJECTION OF STEROID Bilateral 07/20/2023    Procedure: INJECTION, STEROID, EPIDURAL, TRANSFORAMINAL APPROACH Bilateral L3;  Surgeon: Shanell Cain MD;  Location: [facility] OR;  Service: Pain Management;  Laterality: Bilateral;  Bilateral TFESI L3    TRANSFORAMINAL EPIDURAL INJECTION OF STEROID Bilateral 09/21/2023    Procedure: INJECTION, STEROID, EPIDURAL, TRANSFORAMINAL APPROACH Bilateral L5;  Surgeon: Shanell Cain MD;  Location: [facility] OR;  Service: Pain Management;  Laterality: Bilateral;  Bilateral TFESI L5     No family history on file.  Social History     Tobacco Use    Smoking status: Never    Smokeless tobacco: Never   Substance Use Topics    Alcohol use: Never    Drug use: Never     Review of Systems   Constitutional:  Negative for fever.   Respiratory:  Negative for cough.    Cardiovascular:  Negative for chest pain.   Gastrointestinal:  Negative for abdominal pain, constipation, diarrhea, nausea and vomiting.   Genitourinary:  Negative for flank pain and frequency.   Musculoskeletal:  Positive for back pain.   Skin:  Positive for wound.   Neurological:  Positive for weakness. Negative for numbness and headaches.       Physical Exam     Initial Vitals [03/12/24 1430]   BP Pulse Resp Temp SpO2   (!) 169/95 89 16 97.9 °F (36.6 °C) 97 %      MAP       --         Physical Exam    Nursing note and vitals reviewed.  Constitutional: She appears well-developed and well-nourished. She is not diaphoretic.   HENT:   Head: Normocephalic and atraumatic.   Mouth/Throat: Oropharynx is clear and moist.   Eyes: Conjunctivae and EOM are normal.   Neck: Neck supple.   Normal range of motion.  Cardiovascular:  Normal rate, regular rhythm and normal heart sounds.           Pulmonary/Chest: Breath sounds normal. No respiratory distress.   Abdominal: Abdomen is soft. There is no abdominal tenderness.   Musculoskeletal:         General: Normal range of motion.       Cervical back: Normal range of motion and neck supple.     Neurological: She is alert and oriented to person, place, and time. No sensory deficit.   Dorsiflexion/plantarflexion 5/5 BLE with intact and symmetric sensation  Patient unable to lift lower extremities off the bed secondary to pain   Skin: Skin is warm and dry. Capillary refill takes less than 2 seconds.   Surgical wound is clean, dry, intact   Psychiatric: She has a normal mood and affect.         ED Course   Procedures  Labs Reviewed   COMPREHENSIVE METABOLIC PANEL - Abnormal; Notable for the following components:       Result Value    Potassium Level 3.4 (*)     Glucose Level 144 (*)     Blood Urea Nitrogen 6.5 (*)     All other components within normal limits   PROTIME-INR - Abnormal; Notable for the following components:    PT 14.8 (*)     All other components within normal limits   CBC WITH DIFFERENTIAL - Abnormal; Notable for the following components:    WBC 13.02 (*)     Hgb 11.6 (*)     Hct 36.4 (*)     MCH 26.0 (*)     MCHC 31.9 (*)     Neut # 9.54 (*)     Mono # 1.35 (*)     IG# 0.08 (*)     All other components within normal limits   APTT - Normal   CBC W/ AUTO DIFFERENTIAL    Narrative:     The following orders were created for panel order CBC auto differential.  Procedure                               Abnormality         Status                     ---------                               -----------         ------                     CBC with Differential[[id number]]       Abnormal            Final result                 Please view results for these tests on the individual orders.          Imaging Results              MRI Lumbar Spine Without Contrast (Final result)  Result time 03/12/24 18:37:21      Final result by Deep Sierra MD (03/12/24 18:37:21)                   Impression:      Multilevel severe degenerative changes seen as outlined above with the most significant finding seen at L3-L4.    Increased edema and fluid signal  seen within the disc space at L2-L3 and L3-L4 possibly due to inflammatory change and degenerative changes but early discitis cannot be excluded.  No endplate edema or destruction is seen.  Follow-up is recommended.      Electronically signed by: Oleg Sierra  Date:    03/12/2024  Time:    18:37               Narrative:    EXAMINATION:  MRI LUMBAR SPINE WITHOUT CONTRAST    CLINICAL HISTORY:  Low back pain, prior surgery, new symptoms;Low back pain, progressive neurologic deficit;    TECHNIQUE:  Multiplanar, multisequence MR images were acquired from the thoracolumbar junction to the sacrum without the administration of contrast.    COMPARISON:  MRI dated 07/07/2021    FINDINGS:  The vertebral body heights are well maintained.  Alignment is relatively well maintained.  Cord ends at T12-L1.  Conus appears unremarkable.    At the level L1-L2 moderate facet arthropathy is seen bilaterally.  No significant disc bulge or herniation is seen.  No spinal or foraminal stenosis seen.    At L2-L3 there is endplate sclerosis.  There is significant amount of edema seen in the disc space.  This is likely due to inflammatory change but a developing discitis cannot be completely excluded.  No endplate edema or destructive changes are seen.    There is a broad-based disc osteophyte complex seen which causes bilateral foraminal stenosis and nerve root impingement.  There is also what appears to be a laminectomy defect on the left side.  Postsurgical granulation tissue is seen posteriorly.    At L3-L4 there is a significant amount of edema seen within the disc space.  Findings are likely reactive due to inflammatory change.  There is a large broad-based disc osteophyte complex seen.  There is severe foraminal stenosis and nerve root impingement bilaterally.  There is also acquired spinal stenosis which is severe and the canal is narrowed to 4.7 mm.    The patient is status post spinal fusion posteriorly at the level of L4 and L5.   There is a lateral osteophyte seen on the right and left side.  There is foraminal stenosis bilaterally but worse on the right.    At L5-S1, there is endplate sclerosis and loss of disc height.  There is a broad-based disc osteophyte complex seen.  There is bilateral foraminal stenosis and nerve root impingement.                                       Medications   morphine injection 2 mg (2 mg Intravenous Given 3/12/24 1845)   ondansetron injection 4 mg (4 mg Intravenous Given 3/12/24 1845)   morphine injection 2 mg (2 mg Intravenous Given 3/12/24 2100)   ondansetron injection 4 mg (4 mg Intravenous Given 3/12/24 2100)     Medical Decision Making  78-year-old female status post 2 level lumbar laminectomy yesterday here for postoperative pain.  Her pain is so severe she is unable to ambulate.  She is ambulatory with a walker at baseline.  Family is concerned as she lives alone in his usually able to care for herself.  They are unable to care for her at home at this time.  They contacted Dr. Fair and were instructed to come to the ED for evaluation and possible admission for rehabilitation services.  Patient is afebrile, hemodynamically stable.  The surgical wound is clean and intact.  Distal strength is 5/5, however she is unable to lift her legs off the bed secondary to pain.  I will treat her pain at this time and contact Dr. Fair for recommendations. MRI lumbar spine to be ordered.     Differential diagnosis includes but is not limited to subtherapeutic pain medication, hematoma, seroma and others.     Amount and/or Complexity of Data Reviewed  Labs: ordered. Decision-making details documented in ED Course.  Radiology: ordered. Decision-making details documented in ED Course.    Risk  Decision regarding hospitalization.               ED Course as of 03/12/24 2205   Tue Mar 12, 2024   1701 After speaking with Dr. Fair, patient had a 2 level lumbar laminectomy. Patient is deconditioned at baseline. Patient  "was informed of possible pain following the procedure. She called Dr. Fair's office noting extreme pain stating she could not get up and walk. Dr. Fair would like a MRI of the spine and possible admission for rehab [DP]   1904 Reviewed results with Dr. Fair. Nothing surgical at this time. OK to stay here for rehab placement.  [RB]   1926 Patient initially hesitant to take Morphine, finally did. She is feeling much better with morphine 2mg. Hospital medicine consulted for admit for rehab   [RB]   2000 Hospital Medicine does not feel comfortable admitting the patient here. They recommend [facility] to [facility] where the patient's neurosurgeon has privileges. Patient and family amenable to transfer. [RB]   2105 [place] [facility] [place]      ED Course User Index  [DP] Amanda Fisher  [RB] Clemencia Pascual MD          BP (!) 141/60 (BP Location: Right arm, Patient Position: Lying)   Pulse 78   Temp 99.2 °F (37.3 °C) (Oral)   Resp (!) 23   Ht 5' 5" (1.651 m)   Wt 108.9 kg (240 lb)   LMP  (LMP Unknown)   SpO2 97%   BMI 39.94 kg/m²                    Clinical Impression:  Final diagnoses:  [G89.18] Acute post-operative pain (Primary)  [R53.1] Weakness  [M51.36] DDD (degenerative disc disease), lumbar          ED Disposition Condition    Transfer to Another Facility Stable                Clemencia Pascual MD  03/12/24 3200    "

## 2024-04-22 ENCOUNTER — LAB REQUISITION (OUTPATIENT)
Dept: LAB | Facility: HOSPITAL | Age: 79
End: 2024-04-22
Payer: COMMERCIAL

## 2024-04-22 DIAGNOSIS — N39.0 URINARY TRACT INFECTION, SITE NOT SPECIFIED: ICD-10-CM

## 2024-04-22 LAB
APPEARANCE UR: ABNORMAL
BACTERIA #/AREA URNS AUTO: ABNORMAL /HPF
BILIRUB UR QL STRIP.AUTO: ABNORMAL
COLOR UR AUTO: ABNORMAL
GLUCOSE UR QL STRIP.AUTO: ABNORMAL
HYALINE CASTS URNS QL MICRO: ABNORMAL /LPF
KETONES UR QL STRIP.AUTO: ABNORMAL
LEUKOCYTE ESTERASE UR QL STRIP.AUTO: ABNORMAL
NITRITE UR QL STRIP.AUTO: ABNORMAL
PH UR STRIP.AUTO: 5 [PH]
PROT UR QL STRIP.AUTO: ABNORMAL
RBC #/AREA URNS AUTO: ABNORMAL /HPF
RBC UR QL AUTO: ABNORMAL
SP GR UR STRIP.AUTO: 1.02 (ref 1–1.03)
SQUAMOUS #/AREA URNS AUTO: ABNORMAL /HPF
UROBILINOGEN UR STRIP-ACNC: ABNORMAL
WBC #/AREA URNS AUTO: ABNORMAL /HPF

## 2024-04-22 PROCEDURE — 81001 URINALYSIS AUTO W/SCOPE: CPT | Performed by: INTERNAL MEDICINE

## (undated) DEVICE — CONTRAST ISOVUE M 200 20ML VIL

## (undated) DEVICE — NDL FLTR 5MCRN BLNT TIP 18GX1

## (undated) DEVICE — CANNULA AIRLIFE ETCO2 NSL 7FT

## (undated) DEVICE — DRAPE UTILITY W/ TAPE 20X30IN

## (undated) DEVICE — SYR 3ML LL 18GA 1.5IN

## (undated) DEVICE — GLOVE PROTEXIS PI SYN SURG 6.0

## (undated) DEVICE — SET SMARTSITE EXT SMALLBORE NF

## (undated) DEVICE — BANDAGE SHEER STRIP 3/4X3IN

## (undated) DEVICE — DRAPE MEDIUM SHEET 40X70IN

## (undated) DEVICE — NDL SYR 10ML 18X1.5 LL BLUNT

## (undated) DEVICE — GLOVE PROTEXIS LTX MICRO 6.5

## (undated) DEVICE — Device

## (undated) DEVICE — NDL QUINCKE S/SU 22GA 5IN

## (undated) DEVICE — NDL HYPO REG 25G X 1 1/2

## (undated) DEVICE — CHLORAPREP 10.5 ML APPLICATOR